# Patient Record
Sex: MALE | Race: WHITE | NOT HISPANIC OR LATINO | Employment: FULL TIME | URBAN - METROPOLITAN AREA
[De-identification: names, ages, dates, MRNs, and addresses within clinical notes are randomized per-mention and may not be internally consistent; named-entity substitution may affect disease eponyms.]

---

## 2022-07-06 ENCOUNTER — OFFICE VISIT (OUTPATIENT)
Dept: FAMILY MEDICINE | Facility: CLINIC | Age: 47
End: 2022-07-06
Payer: OTHER GOVERNMENT

## 2022-07-06 VITALS
HEIGHT: 71 IN | HEART RATE: 69 BPM | SYSTOLIC BLOOD PRESSURE: 121 MMHG | BODY MASS INDEX: 29.82 KG/M2 | DIASTOLIC BLOOD PRESSURE: 86 MMHG | WEIGHT: 213 LBS | TEMPERATURE: 97.7 F | OXYGEN SATURATION: 98 % | RESPIRATION RATE: 20 BRPM

## 2022-07-06 DIAGNOSIS — Z71.6 ENCOUNTER FOR SMOKING CESSATION COUNSELING: ICD-10-CM

## 2022-07-06 DIAGNOSIS — U09.9 POST COVID-19 CONDITION, UNSPECIFIED: Primary | ICD-10-CM

## 2022-07-06 PROCEDURE — 99204 OFFICE O/P NEW MOD 45 MIN: CPT | Mod: GC

## 2022-07-06 RX ORDER — FLUTICASONE PROPIONATE 50 MCG
1 SPRAY, SUSPENSION (ML) NASAL DAILY
Qty: 15.8 ML | Refills: 0 | Status: SHIPPED | OUTPATIENT
Start: 2022-07-06 | End: 2024-06-10

## 2022-07-06 RX ORDER — NICOTINE 21 MG/24HR
1 PATCH, TRANSDERMAL 24 HOURS TRANSDERMAL EVERY 24 HOURS
Qty: 28 PATCH | Refills: 4 | Status: SHIPPED | OUTPATIENT
Start: 2022-07-06 | End: 2024-06-10

## 2022-07-06 NOTE — PROGRESS NOTES
Preceptor Attestation:  Patient's case reviewed and discussed with Desire Hull MD resident and I evaluated the patient. I agree with written assessment and plan of care.  Supervising Physician:  Ariel Castellon MD, MD [facility]

## 2022-07-06 NOTE — PROGRESS NOTES
Assessment & Plan     Post covid-19 condition, unspecified  Patient with ongoing cough, head congestion, and sore throat from Covid. Positive on 6/24. Afebrile and oxygenating well. Lungs clear today. Concern for lingering URI symptoms causing post nasal drip and irritating throat. Will start Flonase and counseled on below symptom relief. Do not believe antibiotics are warranted as patient has known viral illness and his throat and lungs clear.   - fluticasone (FLONASE) 50 MCG/ACT nasal spray; Spray 1 spray into both nostrils daily  - continue salt water gargles  - warm showers, honey and tea, Tylenol and ibuprofen PRN    Encounter for smoking cessation counseling  Patient interested in smoking. Counseled on longer recovering from viral illnesses with his 1 PPD smoking history especially with ongoing chest heaviness from post nasal drip. He has tried chantix in the past but never patches. Will start patches and lozenges today. He does not wish to set a goal quit date but says he plans to slowly decrease to a 1/2 pack then 1/4 pack over a few months.   - nicotine (NICODERM CQ) 21 MG/24HR 24 hr patch; Place 1 patch onto the skin every 24 hours  - nicotine (NICORETTE) 4 MG lozenge; Place 1 lozenge (4 mg) inside cheek every hour as needed for smoking cessation       Tobacco Cessation:   reports that he has been smoking. He has been smoking about 1.00 pack per day. He has never used smokeless tobacco.  Tobacco Cessation Action Plan: Pharmacotherapies : Nicotine patch and other Nicotine replacement      Return if symptoms worsen or fail to improve.    Desire Hull MD  [facility]    Momo Aldrich is a 47 year old presenting for the following health issues:  RECHECK (Room 1 (with son) Covid 6/24/22-side effects, cough, sore throat, head cold)      HPI     Acute Illness  Acute illness concerns: Covid positive on 6/24   Onset/Duration: 6/24 -- now lingering symptoms  Symptoms:  Fever:  "No  Chills/Sweats: No  Headache (location?): YES  Sinus Pressure: YES  Conjunctivitis:  No  Ear Pain: no  Rhinorrhea: No  Congestion: YES  Sore Throat: YES  Cough: no  Wheeze: No  Decreased Appetite: No  Nausea: No  Vomiting: No  Diarrhea: No  Dysuria/Freq.: No  Dysuria or Hematuria: No  Fatigue/Achiness: No    Therapies tried and outcome: warm salt water. Some Tylenol and ibuprofen.     Review of Systems   Constitutional, HEENT, cardiovascular, pulmonary, gi and gu systems are negative, except as otherwise noted.      Objective    /86   Pulse 69   Temp 97.7  F (36.5  C) (Oral)   Resp 20   Ht 1.803 m (5' 11\")   Wt 96.6 kg (213 lb)   SpO2 98%   BMI 29.71 kg/m    Body mass index is 29.71 kg/m .  Physical Exam   GENERAL: healthy, alert and no distress  EYES: Eyes grossly normal to inspection, PERRL and conjunctivae and sclerae normal  HENT: nose: swollen and erythematous to bilateral turbinates, ear canals and TM's normal,  mouth without ulcers or lesions, or exudate  NECK: no adenopathy, no asymmetry, masses, or scars and thyroid normal to palpation  RESP: lungs clear to auscultation - no rales, rhonchi or wheezes  CV: regular rate and rhythm, normal S1 S2, no S3 or S4, no murmur, click or rub, no peripheral edema and peripheral pulses strong  ABDOMEN: soft, nontender, no hepatosplenomegaly, no masses and bowel sounds normal  MS: no gross musculoskeletal defects noted, no edema      "

## 2022-07-06 NOTE — PATIENT INSTRUCTIONS
Continue salt water gargle, cough drops, Tylenol and ibuprofen as needed for sore throat, honey in hot water/tea, and Flonase.

## 2023-02-09 ENCOUNTER — ANCILLARY PROCEDURE (OUTPATIENT)
Dept: GENERAL RADIOLOGY | Facility: CLINIC | Age: 48
End: 2023-02-09
Attending: FAMILY MEDICINE
Payer: OTHER GOVERNMENT

## 2023-02-09 ENCOUNTER — OFFICE VISIT (OUTPATIENT)
Dept: FAMILY MEDICINE | Facility: CLINIC | Age: 48
End: 2023-02-09
Payer: OTHER GOVERNMENT

## 2023-02-09 VITALS
WEIGHT: 217 LBS | SYSTOLIC BLOOD PRESSURE: 138 MMHG | BODY MASS INDEX: 30.38 KG/M2 | HEART RATE: 83 BPM | OXYGEN SATURATION: 98 % | HEIGHT: 71 IN | DIASTOLIC BLOOD PRESSURE: 89 MMHG | TEMPERATURE: 98.3 F | RESPIRATION RATE: 18 BRPM

## 2023-02-09 DIAGNOSIS — Z11.4 SCREENING FOR HIV (HUMAN IMMUNODEFICIENCY VIRUS): ICD-10-CM

## 2023-02-09 DIAGNOSIS — Z13.6 CARDIOVASCULAR SCREENING; LDL GOAL LESS THAN 70: ICD-10-CM

## 2023-02-09 DIAGNOSIS — Z11.59 NEED FOR HEPATITIS C SCREENING TEST: ICD-10-CM

## 2023-02-09 DIAGNOSIS — R06.2 WHEEZING: Primary | ICD-10-CM

## 2023-02-09 DIAGNOSIS — Z13.220 SCREENING FOR HYPERLIPIDEMIA: ICD-10-CM

## 2023-02-09 DIAGNOSIS — R06.2 WHEEZING: ICD-10-CM

## 2023-02-09 DIAGNOSIS — F17.200 NICOTINE DEPENDENCE WITH CURRENT USE: ICD-10-CM

## 2023-02-09 DIAGNOSIS — Z12.11 SCREEN FOR COLON CANCER: ICD-10-CM

## 2023-02-09 LAB — HGB BLD-MCNC: 16.5 G/DL (ref 13.3–17.7)

## 2023-02-09 PROCEDURE — 36415 COLL VENOUS BLD VENIPUNCTURE: CPT | Performed by: FAMILY MEDICINE

## 2023-02-09 PROCEDURE — 87389 HIV-1 AG W/HIV-1&-2 AB AG IA: CPT | Performed by: FAMILY MEDICINE

## 2023-02-09 PROCEDURE — 90471 IMMUNIZATION ADMIN: CPT | Performed by: FAMILY MEDICINE

## 2023-02-09 PROCEDURE — 85018 HEMOGLOBIN: CPT | Performed by: FAMILY MEDICINE

## 2023-02-09 PROCEDURE — 99214 OFFICE O/P EST MOD 30 MIN: CPT | Mod: 25 | Performed by: FAMILY MEDICINE

## 2023-02-09 PROCEDURE — 90677 PCV20 VACCINE IM: CPT | Performed by: FAMILY MEDICINE

## 2023-02-09 PROCEDURE — 71046 X-RAY EXAM CHEST 2 VIEWS: CPT | Mod: TC | Performed by: RADIOLOGY

## 2023-02-09 PROCEDURE — 80048 BASIC METABOLIC PNL TOTAL CA: CPT | Performed by: FAMILY MEDICINE

## 2023-02-09 PROCEDURE — 86803 HEPATITIS C AB TEST: CPT | Performed by: FAMILY MEDICINE

## 2023-02-09 PROCEDURE — 80061 LIPID PANEL: CPT | Performed by: FAMILY MEDICINE

## 2023-02-09 RX ORDER — ALBUTEROL SULFATE 90 UG/1
2 AEROSOL, METERED RESPIRATORY (INHALATION) EVERY 6 HOURS PRN
Qty: 18 G | Refills: 1 | Status: SHIPPED | OUTPATIENT
Start: 2023-02-09 | End: 2023-04-05

## 2023-02-09 RX ORDER — PREDNISONE 20 MG/1
40 TABLET ORAL DAILY
Qty: 10 TABLET | Refills: 0 | Status: SHIPPED | OUTPATIENT
Start: 2023-02-09 | End: 2023-02-14

## 2023-02-09 RX ORDER — AZITHROMYCIN 250 MG/1
TABLET, FILM COATED ORAL
Qty: 6 TABLET | Refills: 0 | Status: SHIPPED | OUTPATIENT
Start: 2023-02-09 | End: 2023-02-14

## 2023-02-09 ASSESSMENT — PAIN SCALES - GENERAL: PAINLEVEL: NO PAIN (0)

## 2023-02-09 NOTE — NURSING NOTE
Prior to immunization administration, verified patients identity using patient s name and date of birth. Please see Immunization Activity for additional information.     Screening Questionnaire for Adult Immunization    Are you sick today?   Yes   Do you have allergies to medications, food, a vaccine component or latex?   No   Have you ever had a serious reaction after receiving a vaccination?   No   Do you have a long-term health problem with heart, lung, kidney, or metabolic disease (e.g., diabetes), asthma, a blood disorder, no spleen, complement component deficiency, a cochlear implant, or a spinal fluid leak?  Are you on long-term aspirin therapy?   No   Do you have cancer, leukemia, HIV/AIDS, or any other immune system problem?   No   Do you have a parent, brother, or sister with an immune system problem?   No   In the past 3 months, have you taken medications that affect  your immune system, such as prednisone, other steroids, or anticancer drugs; drugs for the treatment of rheumatoid arthritis, Crohn s disease, or psoriasis; or have you had radiation treatments?   No   Have you had a seizure, or a brain or other nervous system problem?   No   During the past year, have you received a transfusion of blood or blood    products, or been given immune (gamma) globulin or antiviral drug?   No   For women: Are you pregnant or is there a chance you could become       pregnant during the next month?   No   Have you received any vaccinations in the past 4 weeks?   No     Immunization questionnaire was positive for at least one answer.  Notified Rose.        Per orders of Dr. Rose, injection of PPcV20 given by Tabatha Mendoza MA. Patient instructed to remain in clinic for 15 minutes afterwards, and to report any adverse reaction to me immediately.       Screening performed by Tabatha Mendoza MA on 2/9/2023 at 5:04 PM.

## 2023-02-09 NOTE — PROGRESS NOTES
Assessment & Plan     Wheezing  - with history of nicotine dependence presented to the clinic with wheezing, cough for more than 10 days with mild shortness of breath upon exertion.  -No known diagnosis of COPD.    O/E: Bilateral wheezes heard.  Saturation 98% room air.    -Possible COPD exacerbation but no official diagnosis of COPD.  -Prescribing Z-Kamran, prednisone with albuterol as needed.  -Ordered PFTs.  -Highly recommended smoking cessation.  -Received pneumococcal vaccine in clinic today    - azithromycin (ZITHROMAX) 250 MG tablet; Take 2 tablets (500 mg) by mouth daily for 1 day, THEN 1 tablet (250 mg) daily for 4 days.  - predniSONE (DELTASONE) 20 MG tablet; Take 2 tablets (40 mg) by mouth daily for 5 days  - XR Chest 2 Views; Future  - albuterol (PROAIR HFA/PROVENTIL HFA/VENTOLIN HFA) 108 (90 Base) MCG/ACT inhaler; Inhale 2 puffs into the lungs every 6 hours as needed for shortness of breath, wheezing or cough  - General PFT Lab (Please always keep checked); Future  - Pulmonary Function Test; Future    Nicotine dependence with current use  -Smoking for more than 30 years.  Currently on 1 pack/day.  -He added that his job is stressful so he could not quit smoking at this point.  -Offered help with patches/gum/medications but patient reported that he has plenty of them at home.  -Explained the importance of smoking cessation especially since progressing towards lung disease     - General PFT Lab (Please always keep checked); Future  - Pulmonary Function Test; Future    Screen for colon cancer  -No family history of colon cancers.  First-time screening.    - Colonoscopy Screening  Referral; Future    Screening for HIV (human immunodeficiency virus)    - HIV Antigen Antibody Combo; Future  - HIV Antigen Antibody Combo    Need for hepatitis C screening test    - Hepatitis C Screen Reflex to HCV RNA Quant and Genotype; Future  - Hepatitis C Screen Reflex to HCV RNA Quant and  "Genotype    Screening for hyperlipidemia    - Lipid panel reflex to direct LDL Non-fasting; Future  - Lipid panel reflex to direct LDL Non-fasting    CARDIOVASCULAR SCREENING; LDL GOAL LESS THAN 70    - Hemoglobin; Future  - Basic metabolic panel  (Ca, Cl, CO2, Creat, Gluc, K, Na, BUN); Future  - Hemoglobin  - Basic metabolic panel  (Ca, Cl, CO2, Creat, Gluc, K, Na, BUN)         BMI:   Estimated body mass index is 30.27 kg/m  as calculated from the following:    Height as of this encounter: 1.803 m (5' 11\").    Weight as of this encounter: 98.4 kg (217 lb).           Return in about 10 days (around 2/19/2023), or if symptoms worsen or fail to improve.    Rose Carrero MD  [facility] KELLY Aldrich is a 48 year old, presenting for the following health issues:  Infection (Lung problem)      History of Present Illness       Reason for visit:  Possible lung infection  Symptom onset:  1-2 weeks ago    He eats 0-1 servings of fruits and vegetables daily.He consumes 3 sweetened beverage(s) daily.He exercises with enough effort to increase his heart rate 9 or less minutes per day.  He exercises with enough effort to increase his heart rate 3 or less days per week.   He is taking medications regularly.     -Productive cough for 2 weeks  -Denied fever  -H/o smoking - 1PPD- started new job             Review of Systems   Constitutional, HEENT, cardiovascular, pulmonary, gi and gu systems are negative, except as otherwise noted.      Objective    /89   Pulse 83   Temp 98.3  F (36.8  C) (Tympanic)   Resp 18   Ht 1.803 m (5' 11\")   Wt 98.4 kg (217 lb)   SpO2 98%   BMI 30.27 kg/m    Body mass index is 30.27 kg/m .  Physical Exam   GENERAL: healthy, alert and no distress  NECK: no adenopathy, no asymmetry, masses, or scars and thyroid normal to palpation  RESP: Bilateral wheezes heard.  CV: regular rate and rhythm, normal S1 S2, no S3 or S4, no murmur, click or " rub, no peripheral edema and peripheral pulses strong  ABDOMEN: soft, nontender, no hepatosplenomegaly, no masses and bowel sounds normal  MS: no gross musculoskeletal defects noted, no edema

## 2023-02-10 LAB
ANION GAP SERPL CALCULATED.3IONS-SCNC: 2 MMOL/L (ref 3–14)
BUN SERPL-MCNC: 14 MG/DL (ref 7–30)
CALCIUM SERPL-MCNC: 8.9 MG/DL (ref 8.5–10.1)
CHLORIDE BLD-SCNC: 108 MMOL/L (ref 94–109)
CHOLEST SERPL-MCNC: 209 MG/DL
CO2 SERPL-SCNC: 29 MMOL/L (ref 20–32)
CREAT SERPL-MCNC: 0.95 MG/DL (ref 0.66–1.25)
FASTING STATUS PATIENT QL REPORTED: NO
GFR SERPL CREATININE-BSD FRML MDRD: >90 ML/MIN/1.73M2
GLUCOSE BLD-MCNC: 91 MG/DL (ref 70–99)
HCV AB SERPL QL IA: NONREACTIVE
HDLC SERPL-MCNC: 29 MG/DL
HIV 1+2 AB+HIV1 P24 AG SERPL QL IA: NONREACTIVE
LDLC SERPL CALC-MCNC: 139 MG/DL
NONHDLC SERPL-MCNC: 180 MG/DL
POTASSIUM BLD-SCNC: 4.6 MMOL/L (ref 3.4–5.3)
SODIUM SERPL-SCNC: 139 MMOL/L (ref 133–144)
TRIGL SERPL-MCNC: 205 MG/DL

## 2023-02-12 ENCOUNTER — HEALTH MAINTENANCE LETTER (OUTPATIENT)
Age: 48
End: 2023-02-12

## 2023-04-05 ENCOUNTER — HOSPITAL ENCOUNTER (OUTPATIENT)
Facility: AMBULATORY SURGERY CENTER | Age: 48
End: 2023-04-05
Attending: INTERNAL MEDICINE
Payer: OTHER GOVERNMENT

## 2023-04-05 ENCOUNTER — TELEPHONE (OUTPATIENT)
Dept: GASTROENTEROLOGY | Facility: CLINIC | Age: 48
End: 2023-04-05
Payer: OTHER GOVERNMENT

## 2023-04-05 NOTE — TELEPHONE ENCOUNTER
Screening Questions  BLUE  KIND OF PREP RED  LOCATION [review exclusion criteria] GREEN  SEDATION TYPE        Y Are you active on mychart?       JAMAL SILVER Ordering/Referring Provider?        [facility] What type of coverage do you have?      N Have you had a positive covid test in the last 14 days?     30.6 1. BMI  [BMI 40+ - review exclusion criteria]    Y  2. Are you able to give consent for your medical care? [IF NO,RN REVIEW]          N  3. Are you taking any prescription pain medications on a routine schedule   (ex narcotics: oxycodone, roxicodone, oxycontin,  and percocet)? [RN Review]          3a. EXTENDED PREP What kind of prescription?     N 4. Do you have any chemical dependencies such as alcohol, street drugs, or methadone?        **If yes 3- 5 , please schedule with MAC sedation.**          IF YES TO ANY 6 - 10 - HOSPITAL SETTING ONLY.     N 6.   Do you need assistance transferring?     N 7.   Have you had a heart or lung transplant?    N 8.   Are you currently on dialysis?   N 9.   Do you use daily home oxygen?   N 10. Do you take nitroglycerin?   10a.  If yes, how often?     11. [FEMALES]   Are you currently pregnant?    11a.  If yes, how many weeks? [ Greater than 12 weeks, OR NEEDED]    N 12. Do you have Pulmonary Hypertension? *NEED PAC APPT AT [facility] w/ MAC*     N 13. [review exclusion criteria]  Do you have any implantable devices in your body (pacemaker, defib, LVAD)?    N 14. In the past 6 months, have you had any heart related issues including cardiomyopathy or heart attack?     14a.  If yes, did it require cardiac stenting if so when?     N 15. Have you had a stroke or Transient ischemic attack (TIA - aka  mini stroke ) within 6 months?      N 16. Do you have mod to severe Obstructive Sleep Apnea?  [Hospital only]    N 17. Do you have SEVERE AND UNCONTROLLED asthma? *NEED PAC APPT AT [facility] w/MAC*     18. Are you currently taking any blood thinners?     18a. No. Continue to  "19.   18b.    N 19. Do you take the medication Phentermine?    19a. If yes, \"Hold for 7 days before procedure.  Please consult your prescribing provider if you have questions about holding this medication.\"     N  20. Do you have chronic kidney disease?      N  21. Do you have a diagnosis of diabetes?     N  22. On a regular basis do you go 3-5 days between bowel movements?      23. Preferred LOCAL Pharmacy for Pre Prescription    [ LIST ONLY ONE PHARMACY]     [id number] - [place], [place] - [address] [place] [address] AT [facility]        - CLOSING REMINDERS -    Informed patient they will need an adult    Cannot take any type of public or medical transportation alone    Conscious Sedation- Needs  for 6 hours after the procedure       MAC/General-Needs  for 24 hours after procedure    Pre-Procedure Covid test to be completed [[facility] PCR Testing Required]    Confirmed Nurse will call to complete assessment       - SCHEDULING DETAILS -  N Hospital Setting Required? If yes, what is the exclusion?:    ESE  Surgeon    6/7  Date of Procedure  Lower Endoscopy [Colonoscopy]  Type of Procedure Scheduled  [facility]   MIRALAX GATORADE WITHOUT MAGNEISUM CITRATE Which Colonoscopy Prep was Sent?     CS Sedation Type     N PAC / Pre-op Required                 "

## 2023-05-23 ENCOUNTER — TELEPHONE (OUTPATIENT)
Dept: GASTROENTEROLOGY | Facility: CLINIC | Age: 48
End: 2023-05-23

## 2023-05-23 NOTE — TELEPHONE ENCOUNTER
Attempted to contact patient regarding upcoming Colonoscopy  procedure on 6.7.2023 for pre assessment questions. No answer.     Left message to return call to [phone] #[phone]    Discuss Covid policy and designated  policy.    Pre op exam? N/A    Arrival time: 60810. Procedure time: 1125    Facility location: [facility] [place]; [address], [place] [address]    Sedation type: Conscious sedation     NSAIDs? No    Anticoagulants: No    Electronic implanted devices? No    Diabetic? No    Indication for procedure: screening colonoscopy    Bowel prep recommendation: Miralax prep without magnesium citrate     Prep instructions sent via [employer].       Seema Amaya RN  Endoscopy Procedure Pre Assessment RN

## 2023-05-26 NOTE — TELEPHONE ENCOUNTER
Attempted to reach the patient to complete pre-assessment. Second attempt. No answer. Left message. VeriTran[employer] message sent.     Minerva Partida RN   Endoscopy Procedure Pre Assessment RN

## 2023-05-30 ENCOUNTER — TELEPHONE (OUTPATIENT)
Dept: GASTROENTEROLOGY | Facility: CLINIC | Age: 48
End: 2023-05-30
Payer: OTHER GOVERNMENT

## 2023-05-30 NOTE — TELEPHONE ENCOUNTER
Valdemar Rivero Jr.   Caller:   Reason for Reschedule/Cancellation (please be detailed, any staff messages or encounters to note?):     PER PT -- CANCEL WITH NO R/S       Prior to reschedule please review:    Ordering Provider:Rose Harper MD     Sedation per order:MODERATE     Does patient have any ASC Exclusions, please identify?: N       Notes on Cancelled Procedure:  1. Procedure:Lower Endoscopy [Colonoscopy]   2. Date: 06/07/2023  3. Location:[facility] [place]; [address] [place] [address] [address], [place] [address]  4. Surgeon: ESE         Rescheduled: NO

## 2023-10-26 ENCOUNTER — MYC MEDICAL ADVICE (OUTPATIENT)
Dept: FAMILY MEDICINE | Facility: CLINIC | Age: 48
End: 2023-10-26
Payer: OTHER GOVERNMENT

## 2023-10-26 DIAGNOSIS — Z12.11 SCREENING FOR COLON CANCER: Primary | ICD-10-CM

## 2023-12-14 ENCOUNTER — TELEPHONE (OUTPATIENT)
Dept: GASTROENTEROLOGY | Facility: CLINIC | Age: 48
End: 2023-12-14
Payer: OTHER GOVERNMENT

## 2023-12-14 NOTE — TELEPHONE ENCOUNTER
"Endoscopy Scheduling Screen    Have you had a positive Covid test in the last 14 days?  No    Are you active on MyChart?   Yes    What insurance is in the chart?  Other:  [facility]    Ordering/Referring Provider:   MARYURI WEST        (If ordering provider performs procedure, schedule with ordering provider unless otherwise instructed. )    BMI: Estimated body mass index is 30.27 kg/m  as calculated from the following:    Height as of 2/9/23: 1.803 m (5' 11\").    Weight as of 2/9/23: 98.4 kg (217 lb).     Sedation Ordered  moderate sedation.   If patient BMI > 50 do not schedule in ASC.    If patient BMI > 45 do not schedule at [facility].    Are you taking methadone or Suboxone?  No    Are you taking any prescription medications for pain 3 or more times per week?   NO - No RN review required.    Do you have a history of malignant hyperthermia or adverse reaction to anesthesia?  No     Have you been diagnosed or told you have pulmonary hypertension?   No    Do you have an LVAD?  No    Have you been told you have moderate to severe sleep apnea?  No    Have you been told you have COPD, asthma, or any other lung disease?  No    Do you have any heart conditions?  No     Have you ever had an organ transplant?   No    Have you ever had or are you awaiting a heart or lung transplant?   No    Have you had a stroke or transient ischemic attack (TIA aka \"mini stroke\" in the last 6 months?   No    Have you been diagnosed with or been told you have cirrhosis of the liver?   No    Are you currently on dialysis?   No    Do you need assistance transferring?   No    BMI: Estimated body mass index is 30.27 kg/m  as calculated from the following:    Height as of 2/9/23: 1.803 m (5' 11\").    Weight as of 2/9/23: 98.4 kg (217 lb).     Is patients BMI > 40 and scheduling location [facility]?  No    Do you take an injectable medication for weight loss or diabetes (excluding insulin)?  No    Do you take the medication Naltrexone?  No    Do you take blood " thinners?  No       Prep   Are you currently on dialysis or do you have chronic kidney disease?  No    Do you have a diagnosis of diabetes?  No    Do you have a diagnosis of cystic fibrosis (CF)?  No    On a regular basis do you go 3 -5 days between bowel movements?  No    BMI > 40?  No    Preferred Pharmacy:    [id number] - [place], [place] - [address] [place] [address] AT [facility] & [place]  [address] [place] [address]  Phone: [phone] Fax: [phone]      Final Scheduling Details   Colonoscopy prep sent?  Standard MiraLAX    Procedure scheduled  Colonoscopy    Surgeon:  ABRIL     Date of procedure:  04/15/2024     Pre-OP / PAC:   No - Not required for this site.    Location  [facility] - Patient preference.    Sedation   MAC/Deep Sedation  Per Location      Patient Reminders:   You will receive a call from a Nurse to review instructions and health history.  This assessment must be completed prior to your procedure.  Failure to complete the Nurse assessment may result in the procedure being cancelled.      On the day of your procedure, please designate an adult(s) who can drive you home stay with you for the next 24 hours. The medicines used in the exam will make you sleepy. You will not be able to drive.      You cannot take public transportation, ride share services, or non-medical taxi service without a responsible caregiver.  Medical transport services are allowed with the requirement that a responsible caregiver will receive you at your destination.  We require that drivers and caregivers are confirmed prior to your procedure.

## 2024-03-10 ENCOUNTER — HEALTH MAINTENANCE LETTER (OUTPATIENT)
Age: 49
End: 2024-03-10

## 2024-04-12 ENCOUNTER — ANESTHESIA EVENT (OUTPATIENT)
Dept: SURGERY | Facility: AMBULATORY SURGERY CENTER | Age: 49
End: 2024-04-12
Payer: OTHER GOVERNMENT

## 2024-04-15 ENCOUNTER — ANESTHESIA (OUTPATIENT)
Dept: SURGERY | Facility: AMBULATORY SURGERY CENTER | Age: 49
End: 2024-04-15
Payer: OTHER GOVERNMENT

## 2024-04-15 ENCOUNTER — HOSPITAL ENCOUNTER (OUTPATIENT)
Facility: AMBULATORY SURGERY CENTER | Age: 49
Discharge: HOME | End: 2024-04-15
Attending: SURGERY
Payer: OTHER GOVERNMENT

## 2024-04-15 VITALS
SYSTOLIC BLOOD PRESSURE: 167 MMHG | RESPIRATION RATE: 18 BRPM | OXYGEN SATURATION: 95 % | TEMPERATURE: 97 F | DIASTOLIC BLOOD PRESSURE: 97 MMHG | BODY MASS INDEX: 33.6 KG/M2 | WEIGHT: 240 LBS | HEIGHT: 71 IN

## 2024-04-15 DIAGNOSIS — Z12.11 SCREENING FOR COLON CANCER: ICD-10-CM

## 2024-04-15 LAB — COLONOSCOPY: NORMAL

## 2024-04-15 RX ORDER — ONDANSETRON 4 MG/1
4 TABLET, ORALLY DISINTEGRATING ORAL EVERY 30 MIN PRN
Status: DISCONTINUED | OUTPATIENT
Start: 2024-04-15 | End: 2024-04-16 | Stop reason: HOSPADM

## 2024-04-15 RX ORDER — PROPOFOL 10 MG/ML
INJECTION, EMULSION INTRAVENOUS CONTINUOUS PRN
Status: DISCONTINUED | OUTPATIENT
Start: 2024-04-15 | End: 2024-04-15

## 2024-04-15 RX ORDER — ONDANSETRON 2 MG/ML
4 INJECTION INTRAMUSCULAR; INTRAVENOUS EVERY 30 MIN PRN
Status: DISCONTINUED | OUTPATIENT
Start: 2024-04-15 | End: 2024-04-16 | Stop reason: HOSPADM

## 2024-04-15 RX ORDER — PROPOFOL 10 MG/ML
INJECTION, EMULSION INTRAVENOUS PRN
Status: DISCONTINUED | OUTPATIENT
Start: 2024-04-15 | End: 2024-04-15

## 2024-04-15 RX ORDER — LIDOCAINE HYDROCHLORIDE 20 MG/ML
INJECTION, SOLUTION INFILTRATION; PERINEURAL PRN
Status: DISCONTINUED | OUTPATIENT
Start: 2024-04-15 | End: 2024-04-15

## 2024-04-15 RX ORDER — SODIUM CHLORIDE, SODIUM LACTATE, POTASSIUM CHLORIDE, CALCIUM CHLORIDE 600; 310; 30; 20 MG/100ML; MG/100ML; MG/100ML; MG/100ML
INJECTION, SOLUTION INTRAVENOUS CONTINUOUS
Status: DISCONTINUED | OUTPATIENT
Start: 2024-04-15 | End: 2024-04-16 | Stop reason: HOSPADM

## 2024-04-15 RX ORDER — NALOXONE HYDROCHLORIDE 0.4 MG/ML
0.1 INJECTION, SOLUTION INTRAMUSCULAR; INTRAVENOUS; SUBCUTANEOUS
Status: DISCONTINUED | OUTPATIENT
Start: 2024-04-15 | End: 2024-04-16 | Stop reason: HOSPADM

## 2024-04-15 RX ORDER — LABETALOL 20 MG/4 ML (5 MG/ML) INTRAVENOUS SYRINGE
PRN
Status: DISCONTINUED | OUTPATIENT
Start: 2024-04-15 | End: 2024-04-15

## 2024-04-15 RX ORDER — LIDOCAINE 40 MG/G
CREAM TOPICAL
Status: DISCONTINUED | OUTPATIENT
Start: 2024-04-15 | End: 2024-04-16 | Stop reason: HOSPADM

## 2024-04-15 RX ORDER — ONDANSETRON 2 MG/ML
INJECTION INTRAMUSCULAR; INTRAVENOUS PRN
Status: DISCONTINUED | OUTPATIENT
Start: 2024-04-15 | End: 2024-04-15

## 2024-04-15 RX ADMIN — SODIUM CHLORIDE, SODIUM LACTATE, POTASSIUM CHLORIDE, CALCIUM CHLORIDE: 600; 310; 30; 20 INJECTION, SOLUTION INTRAVENOUS at 10:05

## 2024-04-15 RX ADMIN — LIDOCAINE HYDROCHLORIDE 3 ML: 20 INJECTION, SOLUTION INFILTRATION; PERINEURAL at 10:09

## 2024-04-15 RX ADMIN — LABETALOL 20 MG/4 ML (5 MG/ML) INTRAVENOUS SYRINGE 5 MG: at 10:22

## 2024-04-15 RX ADMIN — PROPOFOL 50 MG: 10 INJECTION, EMULSION INTRAVENOUS at 10:09

## 2024-04-15 RX ADMIN — ONDANSETRON 4 MG: 2 INJECTION INTRAMUSCULAR; INTRAVENOUS at 10:15

## 2024-04-15 RX ADMIN — PROPOFOL 200 MCG/KG/MIN: 10 INJECTION, EMULSION INTRAVENOUS at 10:09

## 2024-04-15 RX ADMIN — PROPOFOL 20 MG: 10 INJECTION, EMULSION INTRAVENOUS at 10:10

## 2024-04-15 NOTE — DISCHARGE INSTRUCTIONS
If you have any questions or concerns regarding your procedure, please contact Dr. Bell, his office number is [phone].     Discharge Instructions:    Take you medications as directed and follow up with you primary provider as scheduled.   You should expect to pass air from your rectum after the procedure.   Follow these care guidelines during your recovery for the next 24 hours.   If you have any questions or concerns please contact the provider that performed your procedure.     You were given medicine for sedation:  You have been given medicines during your procedure that might make you sleepy and weak. To prevent problems:    *Rest for the rest of the day after you are home. You should be back to your normal activity tomorrow.  *For the next 24 hours:   -Do not drink alcoholic beverages.   -Do not make any important decisions or sign any legal forms.   -Do not work around machinery or power equipment.     The medicines used for sedation may make you feel nauseated.   *Start with clear liquids, such as tea, jell-o, broth and ginger ale. As you feel better you may add soft foods such as pudding and ice cream.   *When you no longer feel nauseated, you may try your normal diet.     You should be back to eating your normal after 24 hours.     Call if you have any of these problems:  *Fever of 101 degree F or 38 degrees C  *Bleeding from the rectum  *Black stool or blood in your bowel movements  *Nausea with vomiting that does not ease after a few hours.  *Abdominal pain or bloating  *Fainting

## 2024-04-15 NOTE — H&P
"PRE PROCEDURE NOTE - H & P      Chief Complaint/Reason for Procedure:              Screening colonoscopy    Current Outpatient Medications   Medication Sig Dispense Refill    albuterol (PROAIR HFA/PROVENTIL HFA/VENTOLIN HFA) 108 (90 Base) MCG/ACT inhaler INHALE 2 PUFFS INTO THE LUNGS EVERY 6 HOURS AS NEEDED FOR SHORTNESS OF BREATH OR WHEEZING OR COUGH 6.7 g 1    fluticasone (FLONASE) 50 MCG/ACT nasal spray Spray 1 spray into both nostrils daily (Patient not taking: Reported on 2/9/2023) 15.8 mL 0    nicotine (NICODERM CQ) 21 MG/24HR 24 hr patch Place 1 patch onto the skin every 24 hours (Patient not taking: Reported on 2/9/2023) 28 patch 4    nicotine (NICORETTE) 4 MG lozenge Place 1 lozenge (4 mg) inside cheek every hour as needed for smoking cessation (Patient not taking: Reported on 2/9/2023) 168 lozenge 11     Current Facility-Administered Medications   Medication Dose Route Frequency Provider Last Rate Last Admin    lactated ringers infusion   Intravenous Continuous Rachael Sheth MD        lidocaine (LMX4) kit   Topical Q1H PRN Rachael Sheth MD        lidocaine 1 % 0.1-1 mL  0.1-1 mL Other Q1H PRN Rachael Sheth MD        sodium chloride (PF) 0.9% PF flush 3 mL  3 mL Intracatheter Q8H Rachael Sheth MD        sodium chloride (PF) 0.9% PF flush 3 mL  3 mL Intracatheter q1 min prn Rachael Sheth MD            No Known Allergies    Past Medical History:   Diagnosis Date    Gastroesophageal reflux disease     History of angina        Past Surgical History:   Procedure Laterality Date    ARTHROSCOPIC REPAIR ACL Right     DENTAL SURGERY      [place] teeth removal       Pre-sedation assessment:            BP (!) 161/103   Temp 98  F (36.7  C) (Temporal)   Resp 16   Ht 1.803 m (5' 11\")   Wt 108.9 kg (240 lb)   SpO2 92%   BMI 33.47 kg/m    General appearance: alert, appears stated age, and cooperative  Airway: No gross abnormalities appreciated that would increase risk of airway " compromise.   Heart: Regular rate and rhythm  Lungs: Normal respiratory effort    ASA Classification: 1    Sedation Plan based on assessment: Moderate    Impression:  Patient deemed adequate candidate for moderate sedation         Plan:   colonoscopy      Horace Bell MD  4/15/2024 10:00 AM  [phone]

## 2024-04-15 NOTE — ANESTHESIA POSTPROCEDURE EVALUATION
Patient: Valdemar Rivero Jr.    Procedure: Procedure(s):  COLONOSCOPY       Anesthesia Type:  MAC    Note:  Disposition: Outpatient   Postop Pain Control: Uneventful            Sign Out: Well controlled pain   PONV: No   Neuro/Psych: Uneventful            Sign Out: Acceptable/Baseline neuro status   Airway/Respiratory: Uneventful            Sign Out: Acceptable/Baseline resp. status   CV/Hemodynamics: Uneventful            Sign Out: Acceptable CV status; No obvious hypovolemia; No obvious fluid overload   Other NRE: NONE   DID A NON-ROUTINE EVENT OCCUR? No           Last vitals:  Vitals Value Taken Time   /98 04/15/24 1100   Temp 97  F (36.1  C) 04/15/24 1045   Pulse 83 04/15/24 1129   Resp 18 04/15/24 1045   SpO2 92 % 04/15/24 1129   Vitals shown include unfiled device data.    Electronically Signed By: Ariel Chaudhari MD  April 15, 2024  12:42 PM

## 2024-04-15 NOTE — ANESTHESIA PREPROCEDURE EVALUATION
"Anesthesia Pre-Procedure Evaluation    Patient: Valdemar Rivero Jr.   MRN: [id number] : 1975        Procedure : Procedure(s):  COLONOSCOPY          Past Medical History:   Diagnosis Date    Gastroesophageal reflux disease     History of angina       Past Surgical History:   Procedure Laterality Date    ARTHROSCOPIC REPAIR ACL Right     DENTAL SURGERY      Widsom teeth removal      No Known Allergies   Social History     Tobacco Use    Smoking status: Every Day     Current packs/day: 1.00     Types: Cigarettes    Smokeless tobacco: Never   Substance Use Topics    Alcohol use: Never     Comment: sober 1.5 years, 20      Wt Readings from Last 1 Encounters:   24 108.9 kg (240 lb)        Anesthesia Evaluation            ROS/MED HX  ENT/Pulmonary:       Neurologic:       Cardiovascular:    (-) murmur   METS/Exercise Tolerance:     Hematologic:       Musculoskeletal:       GI/Hepatic:     (+) GERD,       bowel prep,            Renal/Genitourinary:       Endo:     (+)               Obesity,       Psychiatric/Substance Use:       Infectious Disease:       Malignancy:       Other:            Physical Exam    Airway  airway exam normal      Mallampati: II   TM distance: > 3 FB   Neck ROM: full   Mouth opening: > 3 cm    Respiratory Devices and Support         Dental       (+) Completely normal teeth      Cardiovascular   cardiovascular exam normal       Rhythm and rate: regular and normal (-) no murmur    Pulmonary   pulmonary exam normal        breath sounds clear to auscultation           OUTSIDE LABS:  CBC:   Lab Results   Component Value Date    HGB 16.5 2023     BMP:   Lab Results   Component Value Date     2023    POTASSIUM 4.6 2023    CHLORIDE 108 2023    CO2 29 2023    BUN 14 2023    CR 0.95 2023    GLC 91 2023     COAGS: No results found for: \"PTT\", \"INR\", \"FIBR\"  POC: No results found for: \"BGM\", \"HCG\", \"HCGS\"  HEPATIC: No results found for: " "\"ALBUMIN\", \"PROTTOTAL\", \"ALT\", \"AST\", \"GGT\", \"ALKPHOS\", \"BILITOTAL\", \"BILIDIRECT\", \"GILBERTO\"  OTHER:   Lab Results   Component Value Date    FAITH 8.9 02/09/2023       Anesthesia Plan    ASA Status:  2    NPO Status:  NPO Appropriate    Anesthesia Type: MAC.     - Reason for MAC: straight local not clinically adequate              Consents    Anesthesia Plan(s) and associated risks, benefits, and realistic alternatives discussed. Questions answered and patient/representative(s) expressed understanding.     - Discussed: Risks, Benefits and Alternatives for BOTH SEDATION and the PROCEDURE were discussed     - Discussed with:  Patient            Postoperative Care    Pain management: IV analgesics, Oral pain medications, Multi-modal analgesia.   PONV prophylaxis: Ondansetron (or other 5HT-3), Background Propofol Infusion     Comments:    Other Comments: Risks of MAC anesthesia including but not limited to recall, awareness, and potential conversion to general anesthesia discussed.           Ariel Chaudhari MD    I have reviewed the pertinent notes and labs in the chart from the past 30 days and (re)examined the patient.  Any updates or changes from those notes are reflected in this note.              # Obesity: Estimated body mass index is 33.47 kg/m  as calculated from the following:    Height as of this encounter: 1.803 m (5' 11\").    Weight as of this encounter: 108.9 kg (240 lb).      "

## 2024-04-15 NOTE — ANESTHESIA CARE TRANSFER NOTE
Patient: Valdemar Rivero Jr.    Procedure: Procedure(s):  COLONOSCOPY       Diagnosis: Screening for colon cancer [Z12.11]  Diagnosis Additional Information: No value filed.    Anesthesia Type:   MAC     Note:    Oropharynx: oropharynx clear of all foreign objects and spontaneously breathing  Level of Consciousness: awake  Oxygen Supplementation: room air    Independent Airway: airway patency satisfactory and stable  Dentition: dentition unchanged  Vital Signs Stable: post-procedure vital signs reviewed and stable  Report to RN Given: handoff report given  Patient transferred to: Phase II    Handoff Report: Identifed the Patient, Identified the Reponsible Provider, Reviewed the pertinent medical history, Discussed the surgical course, Reviewed Intra-OP anesthesia mangement and issues during anesthesia, Set expectations for post-procedure period and Allowed opportunity for questions and acknowledgement of understanding      Vitals:  Vitals Value Taken Time   /97 04/15/24 1045   Temp 97  F (36.1  C) 04/15/24 1045   Pulse 87 04/15/24 1045   Resp 18 04/15/24 1045   SpO2 95 % 04/15/24 1045       Electronically Signed By: MEKHI Witt CRNA  April 15, 2024  10:47 AM

## 2024-04-26 ENCOUNTER — OFFICE VISIT (OUTPATIENT)
Dept: FAMILY MEDICINE | Facility: CLINIC | Age: 49
End: 2024-04-26
Payer: OTHER GOVERNMENT

## 2024-04-26 VITALS
SYSTOLIC BLOOD PRESSURE: 157 MMHG | WEIGHT: 238.08 LBS | HEART RATE: 84 BPM | DIASTOLIC BLOOD PRESSURE: 111 MMHG | BODY MASS INDEX: 33.33 KG/M2 | RESPIRATION RATE: 20 BRPM | HEIGHT: 71 IN | TEMPERATURE: 97.6 F | OXYGEN SATURATION: 96 %

## 2024-04-26 DIAGNOSIS — E78.5 HYPERLIPIDEMIA LDL GOAL <130: ICD-10-CM

## 2024-04-26 DIAGNOSIS — Z71.6 ENCOUNTER FOR SMOKING CESSATION COUNSELING: ICD-10-CM

## 2024-04-26 DIAGNOSIS — K21.9 GASTROESOPHAGEAL REFLUX DISEASE WITHOUT ESOPHAGITIS: ICD-10-CM

## 2024-04-26 DIAGNOSIS — I10 ESSENTIAL HYPERTENSION: Primary | ICD-10-CM

## 2024-04-26 LAB — HBA1C MFR BLD: 5.4 % (ref 0–5.6)

## 2024-04-26 PROCEDURE — 83036 HEMOGLOBIN GLYCOSYLATED A1C: CPT

## 2024-04-26 PROCEDURE — 84443 ASSAY THYROID STIM HORMONE: CPT

## 2024-04-26 PROCEDURE — 80053 COMPREHEN METABOLIC PANEL: CPT

## 2024-04-26 PROCEDURE — 99214 OFFICE O/P EST MOD 30 MIN: CPT

## 2024-04-26 PROCEDURE — 36415 COLL VENOUS BLD VENIPUNCTURE: CPT

## 2024-04-26 PROCEDURE — 80061 LIPID PANEL: CPT

## 2024-04-26 RX ORDER — FAMOTIDINE 40 MG/1
40 TABLET, FILM COATED ORAL DAILY
Qty: 90 TABLET | Refills: 3 | Status: SHIPPED | OUTPATIENT
Start: 2024-04-26 | End: 2024-06-10

## 2024-04-26 RX ORDER — LOSARTAN POTASSIUM 25 MG/1
25 TABLET ORAL DAILY
Qty: 90 TABLET | Refills: 3 | Status: SHIPPED | OUTPATIENT
Start: 2024-04-26 | End: 2024-06-10

## 2024-04-26 NOTE — PROGRESS NOTES
I have personally reviewed the history and examination as documented by Dr. Mathews.  I was present during key portions of the visit and agree with the assessment and plan as documented for 49 yr old male w/ hx of GERD, recent colonoscopy w/ polyps, here for new HTN diagnosis, concern for hiatal hernia. Will start ARB for BP and refer for upper scope to further eval refractory GERD/ ?hiatal hernia. Smoking cessation reviewed. Precautions given. Anticipatory guidance given.     Graham Perez MD  April 26, 2024  2:34 PM

## 2024-04-26 NOTE — PROGRESS NOTES
Assessment & Plan     Essential hypertension  Elevated to 157/111, consistent with readings he gets at home. Will start losartan today and see him back in 1 month (starting a new job in two weeks and did not want to come in sooner than that). Encouraged to take blood pressure at home as well. Has not had labs in over 1 year, so will get baseline labs today as well.   - losartan (COZAAR) 25 MG tablet  Dispense: 90 tablet; Refill: 3  - Comprehensive metabolic panel  - Hemoglobin A1c  - Lipid panel  - TSH with free T4 reflex    Encounter for smoking cessation counseling  Discussed starting some sort of intervention for smoking cessation today, but patient declined at this time as he prefers to cut back on the amount of cigarettes he's smoking daily first. Has failed nicotine replacement and Chantix in the past. Does have history of depression/anxiety, so bupropion could be a reasonable choice in the future.      Gastroesophageal reflux disease without esophagitis  Has had worsening heartburn and chest tightness/fullness, consistent with GERD. Denies any exertional chest pain or reproduction of symptoms with exertion. Has tried omeprazole in the past, but is adamant that this caused a GI bleed. Does have a history of alcohol use disorder. Differential diagnosis includes GERD, hiatal hernia, esophagitis, peptic ulcer disease, H. Pylori. Given clinical scenario, will refer for EGD to assess for anatomical cause versus ulcer/esophagitis in addition to trial of famotidine today. Of note, has multiple risk factors for CVD and would work this up with stress test at next visit if EGD and famotidine is unrevealing.   - famotidine (PEPCID) 40 MG tablet  Dispense: 90 tablet; Refill: 3  - UPPER GI ENDOSCOPY        Nicotine/Tobacco Cessation  He reports that he has been smoking cigarettes. He has never used smokeless tobacco.  Nicotine/Tobacco Cessation Plan  Information offered: Patient not interested at this  "time      BMI  Estimated body mass index is 32.96 kg/m  as calculated from the following:    Height as of this encounter: 1.81 m (5' 11.26\").    Weight as of this encounter: 108 kg (238 lb 1.3 oz).       Subjective   Yony is a 49 year old, presenting for the following health issue:  #Hearburn/GERD  States he has dealt with heartburn symptoms for years and has progressively been getting worse. Feels a sensation of fullness of chest pressure/tightness when leaning over, lying down, and especially after big meals. Denies any recurrence of symptoms with exertion. Denies any trouble swallowing solids or liquids. He has tried omeprazole in the past and was adamant that this caused him to have darker/bloody stools. Did recently have colonoscopy in which he had six polyps removed and was recommended 3 year follow-up. He also has a history of alcohol use disorder (sober for five years now).     #Elevated blood pressure   Has a blood pressure cuff at home and gets readings around 150/100 consistently at home. Denies any problems with vision, denies headaches, dizziness, or shortness of breath.     #Smoking cessation  Cut down from pack per day to half pack per day. Has smoked for the past 30 years consistently. He has tried patches, lozenges, and chantix in the past, all of which were unsuccessful.       Objective    BP (!) 157/111 (BP Location: Right arm, Patient Position: Sitting, Cuff Size: Adult Large)   Pulse 84   Temp 97.6  F (36.4  C)   Resp 20   Ht 1.81 m (5' 11.26\")   Wt 108 kg (238 lb 1.3 oz)   SpO2 96%   BMI 32.96 kg/m    Body mass index is 32.96 kg/m .  Physical Exam   GENERAL: alert and no distress  NECK: no adenopathy, no asymmetry, masses, or scars  RESP: lungs clear to auscultation - no rales, rhonchi or wheezes  CV: regular rate and rhythm, normal S1 S2, no S3 or S4, no murmur, click or rub, no peripheral edema  ABDOMEN: soft, nontender, no hepatosplenomegaly, no masses and bowel sounds normal  MS: no " gross musculoskeletal defects noted, no edema          Polo Mathews MD  [facility] - [place], [place] Residency, PGY-1

## 2024-04-27 LAB
ALBUMIN SERPL BCG-MCNC: 4.6 G/DL (ref 3.5–5.2)
ALP SERPL-CCNC: 64 U/L (ref 40–150)
ALT SERPL W P-5'-P-CCNC: 38 U/L (ref 0–70)
ANION GAP SERPL CALCULATED.3IONS-SCNC: 10 MMOL/L (ref 7–15)
AST SERPL W P-5'-P-CCNC: 29 U/L (ref 0–45)
BILIRUB SERPL-MCNC: 0.3 MG/DL
BUN SERPL-MCNC: 10.5 MG/DL (ref 6–20)
CALCIUM SERPL-MCNC: 9.7 MG/DL (ref 8.6–10)
CHLORIDE SERPL-SCNC: 103 MMOL/L (ref 98–107)
CHOLEST SERPL-MCNC: 244 MG/DL
CREAT SERPL-MCNC: 0.96 MG/DL (ref 0.67–1.17)
DEPRECATED HCO3 PLAS-SCNC: 23 MMOL/L (ref 22–29)
EGFRCR SERPLBLD CKD-EPI 2021: >90 ML/MIN/1.73M2
FASTING STATUS PATIENT QL REPORTED: ABNORMAL
GLUCOSE SERPL-MCNC: 97 MG/DL (ref 70–99)
HDLC SERPL-MCNC: 24 MG/DL
LDLC SERPL CALC-MCNC: ABNORMAL MG/DL
NONHDLC SERPL-MCNC: 220 MG/DL
POTASSIUM SERPL-SCNC: 4.9 MMOL/L (ref 3.4–5.3)
PROT SERPL-MCNC: 7.2 G/DL (ref 6.4–8.3)
SODIUM SERPL-SCNC: 136 MMOL/L (ref 135–145)
TRIGL SERPL-MCNC: 432 MG/DL
TSH SERPL DL<=0.005 MIU/L-ACNC: 1.11 UIU/ML (ref 0.3–4.2)

## 2024-04-29 RX ORDER — ATORVASTATIN CALCIUM 40 MG/1
40 TABLET, FILM COATED ORAL DAILY
Qty: 90 TABLET | Refills: 3 | Status: SHIPPED | OUTPATIENT
Start: 2024-04-29

## 2024-05-15 ENCOUNTER — TELEPHONE (OUTPATIENT)
Dept: GASTROENTEROLOGY | Facility: CLINIC | Age: 49
End: 2024-05-15
Payer: OTHER GOVERNMENT

## 2024-05-15 NOTE — TELEPHONE ENCOUNTER
"Endoscopy Scheduling Screen    Have you had a positive Covid test in the last 14 days?  No      What is your communication preference for Instructions and/or Bowel Prep?   MyChart      What insurance is in the chart?  Other:  [facility]    Ordering/Referring Provider: LINN MENDOZA   (If ordering provider performs procedure, schedule with ordering provider unless otherwise instructed. )    BMI: Estimated body mass index is 32.96 kg/m  as calculated from the following:    Height as of 4/26/24: 1.81 m (5' 11.26\").    Weight as of 4/26/24: 108 kg (238 lb 1.3 oz).     Sedation Ordered  moderate sedation.   If patient BMI > 50 do not schedule in [facility].    If patient BMI > 45 [facility]    Are you taking methadone or Suboxone?  No    Have you had difficulties, pain, or discomfort during past endoscopy procedures?  No    Are you taking any prescription medications for pain 3 or more times per week?   NO, No RN review required.      Do you have a history of malignant hyperthermia?  No      (Females) Are you currently pregnant?          Have you been diagnosed or told you have pulmonary hypertension?   No      Do you have an LVAD?  No      Have you been told you have moderate to severe sleep apnea?  No    Have you been told you have COPD, asthma, or any other lung disease?  No      Do you have any heart conditions?  No       Have you ever had or are you waiting for an organ transplant?  No. Continue scheduling, no site restrictions.      Have you had a stroke or transient ischemic attack (TIA aka \"mini stroke\" in the last 6 months?   No      Have you been diagnosed with or been told you have cirrhosis of the liver?   No      Are you currently on dialysis?   No      Do you need assistance transferring?   No    BMI: Estimated body mass index is 32.96 kg/m  as calculated from the following:    Height as of 4/26/24: 1.81 m (5' 11.26\").    Weight as of 4/26/24: 108 kg (238 lb 1.3 oz).     Is patients BMI > 40 and " scheduling location [facility]?  No      Do you take an injectable medication for weight loss or diabetes (excluding insulin)?  No    Do you take the medication Naltrexone?  No    Do you take blood thinners?  No       Prep   Are you currently on dialysis or do you have chronic kidney disease?  No    Do you have a diagnosis of diabetes?  No    Do you have a diagnosis of cystic fibrosis (CF)?  No    On a regular basis do you go 3 -5 days between bowel movements?  N/A    BMI > 40?      Preferred Pharmacy:    [id number] - [place], [place] - [address] [place] [address]  Phone: [phone] Fax: [phone]      Final Scheduling Details     Procedure scheduled  Upper endoscopy (EGD)    Surgeon:  JEREMI     Date of procedure:  5/22/24     Pre-OP / PAC:   No - Not required for this site.    Location  [facility] - Patient preference.    Sedation   MAC/Deep Sedation  per location      Patient Reminders:   You will receive a call from a Nurse to review instructions and health history.  This assessment must be completed prior to your procedure.  Failure to complete the Nurse assessment may result in the procedure being cancelled.      On the day of your procedure, please designate an adult(s) who can drive you home stay with you for the next 24 hours. The medicines used in the exam will make you sleepy. You will not be able to drive.      You cannot take public transportation, ride share services, or non-medical taxi service without a responsible caregiver.  Medical transport services are allowed with the requirement that a responsible caregiver will receive you at your destination.  We require that drivers and caregivers are confirmed prior to your procedure.

## 2024-05-21 ENCOUNTER — ANESTHESIA EVENT (OUTPATIENT)
Dept: SURGERY | Facility: AMBULATORY SURGERY CENTER | Age: 49
End: 2024-05-21
Payer: OTHER GOVERNMENT

## 2024-05-22 ENCOUNTER — HOSPITAL ENCOUNTER (OUTPATIENT)
Facility: AMBULATORY SURGERY CENTER | Age: 49
Discharge: HOME | End: 2024-05-22
Attending: SURGERY
Payer: OTHER GOVERNMENT

## 2024-05-22 ENCOUNTER — ANESTHESIA (OUTPATIENT)
Dept: SURGERY | Facility: AMBULATORY SURGERY CENTER | Age: 49
End: 2024-05-22
Payer: OTHER GOVERNMENT

## 2024-05-22 VITALS
RESPIRATION RATE: 16 BRPM | HEART RATE: 74 BPM | OXYGEN SATURATION: 99 % | DIASTOLIC BLOOD PRESSURE: 96 MMHG | SYSTOLIC BLOOD PRESSURE: 154 MMHG | TEMPERATURE: 97.2 F

## 2024-05-22 DIAGNOSIS — K44.9 HIATAL HERNIA: Primary | ICD-10-CM

## 2024-05-22 DIAGNOSIS — K21.9 GASTROESOPHAGEAL REFLUX DISEASE WITHOUT ESOPHAGITIS: ICD-10-CM

## 2024-05-22 LAB — UPPER GI ENDOSCOPY: NORMAL

## 2024-05-22 RX ORDER — NALOXONE HYDROCHLORIDE 0.4 MG/ML
0.1 INJECTION, SOLUTION INTRAMUSCULAR; INTRAVENOUS; SUBCUTANEOUS
Status: DISCONTINUED | OUTPATIENT
Start: 2024-05-22 | End: 2024-05-23 | Stop reason: HOSPADM

## 2024-05-22 RX ORDER — ONDANSETRON 2 MG/ML
4 INJECTION INTRAMUSCULAR; INTRAVENOUS EVERY 30 MIN PRN
Status: DISCONTINUED | OUTPATIENT
Start: 2024-05-22 | End: 2024-05-23 | Stop reason: HOSPADM

## 2024-05-22 RX ORDER — OXYCODONE HYDROCHLORIDE 10 MG/1
10 TABLET ORAL
Status: DISCONTINUED | OUTPATIENT
Start: 2024-05-22 | End: 2024-05-23 | Stop reason: HOSPADM

## 2024-05-22 RX ORDER — SODIUM CHLORIDE, SODIUM LACTATE, POTASSIUM CHLORIDE, CALCIUM CHLORIDE 600; 310; 30; 20 MG/100ML; MG/100ML; MG/100ML; MG/100ML
INJECTION, SOLUTION INTRAVENOUS CONTINUOUS
Status: DISCONTINUED | OUTPATIENT
Start: 2024-05-22 | End: 2024-05-23 | Stop reason: HOSPADM

## 2024-05-22 RX ORDER — DEXAMETHASONE SODIUM PHOSPHATE 4 MG/ML
4 INJECTION, SOLUTION INTRA-ARTICULAR; INTRALESIONAL; INTRAMUSCULAR; INTRAVENOUS; SOFT TISSUE
Status: DISCONTINUED | OUTPATIENT
Start: 2024-05-22 | End: 2024-05-23 | Stop reason: HOSPADM

## 2024-05-22 RX ORDER — FENTANYL CITRATE 0.05 MG/ML
25 INJECTION, SOLUTION INTRAMUSCULAR; INTRAVENOUS
Status: DISCONTINUED | OUTPATIENT
Start: 2024-05-22 | End: 2024-05-23 | Stop reason: HOSPADM

## 2024-05-22 RX ORDER — LIDOCAINE 40 MG/G
CREAM TOPICAL
Status: DISCONTINUED | OUTPATIENT
Start: 2024-05-22 | End: 2024-05-23 | Stop reason: HOSPADM

## 2024-05-22 RX ORDER — PROPOFOL 10 MG/ML
INJECTION, EMULSION INTRAVENOUS CONTINUOUS PRN
Status: DISCONTINUED | OUTPATIENT
Start: 2024-05-22 | End: 2024-05-22

## 2024-05-22 RX ORDER — LIDOCAINE HYDROCHLORIDE 20 MG/ML
INJECTION, SOLUTION INFILTRATION; PERINEURAL PRN
Status: DISCONTINUED | OUTPATIENT
Start: 2024-05-22 | End: 2024-05-22

## 2024-05-22 RX ORDER — PROPOFOL 10 MG/ML
INJECTION, EMULSION INTRAVENOUS PRN
Status: DISCONTINUED | OUTPATIENT
Start: 2024-05-22 | End: 2024-05-22

## 2024-05-22 RX ORDER — OXYCODONE HYDROCHLORIDE 5 MG/1
5 TABLET ORAL
Status: DISCONTINUED | OUTPATIENT
Start: 2024-05-22 | End: 2024-05-23 | Stop reason: HOSPADM

## 2024-05-22 RX ORDER — ONDANSETRON 4 MG/1
4 TABLET, ORALLY DISINTEGRATING ORAL EVERY 30 MIN PRN
Status: DISCONTINUED | OUTPATIENT
Start: 2024-05-22 | End: 2024-05-23 | Stop reason: HOSPADM

## 2024-05-22 RX ADMIN — PROPOFOL 80 MG: 10 INJECTION, EMULSION INTRAVENOUS at 12:20

## 2024-05-22 RX ADMIN — SODIUM CHLORIDE, SODIUM LACTATE, POTASSIUM CHLORIDE, CALCIUM CHLORIDE: 600; 310; 30; 20 INJECTION, SOLUTION INTRAVENOUS at 12:23

## 2024-05-22 RX ADMIN — PROPOFOL 40 MG: 10 INJECTION, EMULSION INTRAVENOUS at 12:22

## 2024-05-22 RX ADMIN — LIDOCAINE HYDROCHLORIDE 60 MG: 20 INJECTION, SOLUTION INFILTRATION; PERINEURAL at 12:20

## 2024-05-22 ASSESSMENT — LIFESTYLE VARIABLES: TOBACCO_USE: 1

## 2024-05-22 NOTE — ANESTHESIA POSTPROCEDURE EVALUATION
Patient: Valdemar Rivero Jr.    Procedure: Procedure(s):  ESOPHAGOGASTRODUODENOSCOPY, WITH BIOPSY       Anesthesia Type:  MAC    Note:  Disposition: Outpatient   Postop Pain Control: Uneventful            Sign Out: Well controlled pain   PONV: No   Neuro/Psych: Uneventful            Sign Out: Acceptable/Baseline neuro status   Airway/Respiratory: Uneventful            Sign Out: Acceptable/Baseline resp. status   CV/Hemodynamics: Uneventful            Sign Out: Acceptable CV status; No obvious hypovolemia; No obvious fluid overload   Other NRE: NONE   DID A NON-ROUTINE EVENT OCCUR? No           Last vitals:  Vitals Value Taken Time   /96 05/22/24 1300   Temp 97.2  F (36.2  C) 05/22/24 1232   Pulse 76 05/22/24 1300   Resp 16 05/22/24 1245   SpO2 99 % 05/22/24 1300   Vitals shown include unfiled device data.    Electronically Signed By: Kale Patel MD  May 22, 2024  1:14 PM

## 2024-05-22 NOTE — ANESTHESIA CARE TRANSFER NOTE
Patient: Valdemar Rivero Jr.    Procedure: Procedure(s):  ESOPHAGOGASTRODUODENOSCOPY, WITH BIOPSY       Diagnosis: Gastroesophageal reflux disease without esophagitis [K21.9]  Diagnosis Additional Information: No value filed.    Anesthesia Type:   MAC     Note:    Oropharynx: oropharynx clear of all foreign objects and spontaneously breathing  Level of Consciousness: awake  Oxygen Supplementation: room air    Independent Airway: airway patency satisfactory and stable  Dentition: dentition unchanged  Vital Signs Stable: post-procedure vital signs reviewed and stable  Report to RN Given: handoff report given  Patient transferred to: Phase II    Handoff Report: Identifed the Patient, Identified the Reponsible Provider, Reviewed the pertinent medical history, Discussed the surgical course, Reviewed Intra-OP anesthesia mangement and issues during anesthesia, Set expectations for post-procedure period and Allowed opportunity for questions and acknowledgement of understanding  Vitals:  Vitals Value Taken Time   /73 05/22/24 1233   Temp 97.2  F (36.2  C) 05/22/24 1232   Pulse 84 05/22/24 1233   Resp 16 05/22/24 1232   SpO2 94 % 05/22/24 1233   Vitals shown include unfiled device data.    Electronically Signed By: MEKHI Gant CRNA  May 22, 2024  12:36 PM

## 2024-05-22 NOTE — DISCHARGE INSTRUCTIONS
If you have any questions or concerns regarding your procedure, please contact Dr. Olson, his office number is [phone].    Upper GI Endoscopy: What to Expect at Home  Your Recovery  You had an upper GI endoscopy. Your doctor used a thin, lighted tube that bends to look at the inside of your esophagus, your stomach, and the first part of the small intestine, called the duodenum.  After you have an endoscopy, you will stay at the hospital or clinic for 1 to 2 hours. This will allow the medicine to wear off. You will be able to go home after your doctor or nurse checks to make sure that you're not having any problems.  You may have to stay overnight if you had treatment during the test. You may have a sore throat for a day or two after the test.  This care sheet gives you a general idea about what to expect after the test.  How can you care for yourself at home?  Activity   Rest as much as you need to after you go home.  You should be able to go back to your usual activities the day after the test.  Diet   Follow your doctor's directions for eating after the test.  Drink plenty of fluids (unless your doctor has told you not to).  Medications   If you have a sore throat the day after the test, use an over-the-counter spray to numb your throat.  Follow-up care is a key part of your treatment and safety. Be sure to make and go to all appointments, and call your doctor if you are having problems. It's also a good idea to know your test results and keep a list of the medicines you take.  When should you call for help?   Call 911 anytime you think you may need emergency care. For example, call if:    You passed out (lost consciousness).     You have trouble breathing.     You pass maroon or bloody stools.   Call your doctor now or seek immediate medical care if:    You have pain that does not get better after your take pain medicine.     You have new or worse belly pain.     You have blood in your stools.     You are  "sick to your stomach and cannot keep fluids down.     You have a fever.     You cannot pass stools or gas.   Watch closely for changes in your health, and be sure to contact your doctor if:    Your throat still hurts after a day or two.     You do not get better as expected.   Where can you learn more?  Go to https://www.[employer].net/patiented  Enter J454 in the search box to learn more about \"Upper GI Endoscopy: What to Expect at Home.\"  Current as of: October 19, 2023               Content Version: 14.0    299[address].   Care instructions adapted under license by your healthcare professional. If you have questions about a medical condition or this instruction, always ask your healthcare professional. [employer] [address] disclaims any warranty or liability for your use of this information.    Hiatal Hernia: Care Instructions  Your Care Instructions  A hiatal hernia occurs when part of the stomach bulges into the chest cavity.  A hiatal hernia may allow stomach acid and juices to back up into the esophagus (acid reflux). This can cause a feeling of burning, warmth, heat, or pain behind the breastbone. This feeling may often occur after you eat, soon after you lie down, or when you bend forward, and it may come and go. You also may have a sour taste in your mouth. These symptoms are commonly known as heartburn or reflux. But not all hiatal hernias cause symptoms.  Follow-up care is a key part of your treatment and safety. Be sure to make and go to all appointments, and call your doctor if you are having problems. It's also a good idea to know your test results and keep a list of the medicines you take.  How can you care for yourself at home?  Take your medicines exactly as prescribed. Call your doctor if you think you are having a problem with your medicine.  Do not take aspirin or other nonsteroidal anti-inflammatory drugs (NSAIDs), such as ibuprofen (Advil, Motrin) or naproxen (Aleve), " unless your doctor says it is okay. Ask your doctor what you can take for pain.  Your doctor may recommend over-the-counter medicine. For mild or occasional indigestion, antacids such as Tums, Maalox, or Mylanta may help. Your doctor also may recommend over-the-counter acid reducers, such as famotidine (Pepcid AC), cimetidine (Tagamet HB), or omeprazole (Prilosec). Read and follow all instructions on the label. If you use these medicines often, talk with your doctor.  Change your eating habits.  It's best to eat several small meals instead of two or three large meals.  After you eat, wait 2 to 3 hours before you lie down. Snacking close to bedtime isn't a good idea.  Avoid foods that make your symptoms worse. These may include chocolate, mint, alcohol, pepper, spicy foods, high-fat foods, or drinks with caffeine in them, such as tea, coffee, hernan, or energy drinks. If your symptoms are worse after you eat a certain food, you may want to stop eating it to see if your symptoms get better.  Try to quit smoking or chewing tobacco, or cut back as much as you can. If you need help quitting, talk to your doctor about quit-tobacco programs and medicines. These can increase your chances of quitting for good.  If you get heartburn at night, raise the head of your bed 6 to 8 inches by putting the frame on blocks or placing a foam wedge under the head of your mattress. (Adding extra pillows does not work.)  Do not wear tight clothing around your middle.  Lose weight if you need to. Losing just 5 to 10 pounds can help.  When should you call for help?  Call 911 anytime you think you may need emergency care. For example, call if:    You passed out (lose consciousness).     You vomit blood or what looks like coffee grounds.     You pass maroon or very bloody stools.     You have severe belly pain.   Call your doctor now or seek immediate medical care if:    You have new or worse belly pain.     Your stools are black and look like  "tar or have streaks of blood.   Watch closely for changes in your health, and be sure to contact your doctor if:    You are vomiting.     You have new or worse symptoms of indigestion.     You have trouble or pain swallowing.     You are losing weight.     You do not get better as expected.   Where can you learn more?  Go to https://www.[employer].net/patiented  Enter T074 in the search box to learn more about \"Hiatal Hernia: Care Instructions.\"  Current as of: October 19, 2023               Content Version: 14.0    [id number] [employer].   Care instructions adapted under license by your healthcare professional. If you have questions about a medical condition or this instruction, always ask your healthcare professional. [employer] disclaims any warranty or liability for your use of this information.    [facility]   Adult Discharge Orders & Instructions     For 24 hours after surgery    Get plenty of rest.  A responsible adult must stay with you for at least 24 hours after you leave the hospital.   Do not drive or use heavy equipment.  If you have weakness or tingling, don't drive or use heavy equipment until this feeling goes away.  Do not drink alcohol.  Avoid strenuous or risky activities.  Ask for help when climbing stairs.   You may feel lightheaded.  IF so, sit for a few minutes before standing.  Have someone help you get up.   If you have nausea (feel sick to your stomach): Drink only clear liquids such as apple juice, ginger ale, broth or 7-Up.  Rest may also help.  Be sure to drink enough fluids.  Move to a regular diet as you feel able.  You may have a slight fever. Call the doctor if your fever is over 100 F (37.7 C) (taken under the tongue) or lasts longer than 24 hours.  You may have a dry mouth, a sore throat, muscle aches or trouble sleeping.  These should go away after 24 hours.  Do not make important or legal decisions.   Call your doctor for any of the " followin.  Signs of infection (fever, growing tenderness at the surgery site, a large amount of drainage or bleeding, severe pain, foul-smelling drainage, redness, swelling).    2. It has been over 8 to 10 hours since surgery and you are still not able to urinate (pass water).    3.  Headache for over 24 hours.

## 2024-05-22 NOTE — ANESTHESIA PREPROCEDURE EVALUATION
Anesthesia Pre-Procedure Evaluation    Patient: Valdemar Rivero Jr.   MRN: [id number] : 1975        Procedure : Procedure(s):  ESOPHAGOGASTRODUODENOSCOPY, WITH BIOPSY          Past Medical History:   Diagnosis Date    Gastroesophageal reflux disease     History of angina       Past Surgical History:   Procedure Laterality Date    ARTHROSCOPIC REPAIR ACL Right     COLONOSCOPY N/A 4/15/2024    Procedure: COLONOSCOPY;  Surgeon: Horace Bell MD;  Location: [place] [facility]    DENTAL SURGERY      Widsom teeth removal      No Known Allergies   Social History     Tobacco Use    Smoking status: Every Day     Current packs/day: 1.00     Average packs/day: 1 pack/day for 35.4 years (35.4 ttl pk-yrs)     Types: Cigarettes     Start date:     Smokeless tobacco: Never   Substance Use Topics    Alcohol use: Never     Comment: Very rarely      Wt Readings from Last 1 Encounters:   24 108 kg (238 lb 1.3 oz)        Anesthesia Evaluation   Pt has had prior anesthetic. Type: General and MAC.    No history of anesthetic complications       ROS/MED HX  ENT/Pulmonary:  - neg pulmonary ROS   (+)                tobacco use, Current use,                       Neurologic:  - neg neurologic ROS     Cardiovascular:  - neg cardiovascular ROS     METS/Exercise Tolerance: >4 METS    Hematologic:  - neg hematologic  ROS     Musculoskeletal:  - neg musculoskeletal ROS     GI/Hepatic:  - neg GI/hepatic ROS   (+) GERD,                   Renal/Genitourinary:  - neg Renal ROS     Endo:  - neg endo ROS   (+)               Obesity (bmi 33),       Psychiatric/Substance Use:  - neg psychiatric ROS     Infectious Disease:  - neg infectious disease ROS     Malignancy:  - neg malignancy ROS     Other:  - neg other ROS          Physical Exam    Airway        Mallampati: II   TM distance: > 3 FB   Neck ROM: full   Mouth opening: > 3 cm    Respiratory Devices and Support         Dental       (+) Multiple crowns, permanant  "bridges      Cardiovascular   cardiovascular exam normal          Pulmonary   pulmonary exam normal                OUTSIDE LABS:  CBC:   Lab Results   Component Value Date    HGB 16.5 02/09/2023     BMP:   Lab Results   Component Value Date     04/26/2024     02/09/2023    POTASSIUM 4.9 04/26/2024    POTASSIUM 4.6 02/09/2023    CHLORIDE 103 04/26/2024    CHLORIDE 108 02/09/2023    CO2 23 04/26/2024    CO2 29 02/09/2023    BUN 10.5 04/26/2024    BUN 14 02/09/2023    CR 0.96 04/26/2024    CR 0.95 02/09/2023    GLC 97 04/26/2024    GLC 91 02/09/2023     COAGS: No results found for: \"PTT\", \"INR\", \"FIBR\"  POC: No results found for: \"BGM\", \"HCG\", \"HCGS\"  HEPATIC:   Lab Results   Component Value Date    ALBUMIN 4.6 04/26/2024    PROTTOTAL 7.2 04/26/2024    ALT 38 04/26/2024    AST 29 04/26/2024    ALKPHOS 64 04/26/2024    BILITOTAL 0.3 04/26/2024     OTHER:   Lab Results   Component Value Date    A1C 5.4 04/26/2024    FAITH 9.7 04/26/2024    TSH 1.11 04/26/2024       Anesthesia Plan    ASA Status:  2    NPO Status:  NPO Appropriate    Anesthesia Type: MAC.     - Reason for MAC: immobility needed, straight local not clinically adequate   Induction: Propofol.   Maintenance: TIVA.        Consents    Anesthesia Plan(s) and associated risks, benefits, and realistic alternatives discussed. Questions answered and patient/representative(s) expressed understanding.     - Discussed:     - Discussed with:  Patient            Postoperative Care    Pain management: Multi-modal analgesia.   PONV prophylaxis: Ondansetron (or other 5HT-3), Dexamethasone or Solumedrol     Comments:    Other Comments: propofol    Reviewed anesthetic options and risks. Patient agrees to proceed.            Kale Patel MD    I have reviewed the pertinent notes and labs in the chart from the past 30 days and (re)examined the patient.  Any updates or changes from those notes are reflected in this note.              # Obesity: Estimated body mass " "index is 32.96 kg/m  as calculated from the following:    Height as of 4/26/24: 1.81 m (5' 11.26\").    Weight as of 4/26/24: 108 kg (238 lb 1.3 oz).      " none

## 2024-05-22 NOTE — H&P
General Surgery H&P  Valdemar Rivero Jr. MRN# [id number]   Age/Sex: 49 year old male YOB: 1975     Reason for visit: EGD       Referring physician: DR. Harris                   Assessment and Plan:   Assessment:  1.  History of GERD  2.  Concern for either hernia      Plan:  -To the OR today for EGD  - The risks and benefits of the procedure were explained detail to the patient. The risks include infection, bleeding, damage to the surrounding structures. Patient verbalized understanding provided consent to undergo the procedure above.            Chief Complaint:   Here for surgery     History is obtained from the patient    HPI:   Valdemar Rivero Jr. is a 49 year old male who presents today for EGD.  Patient was seen by primary care team.  There was concerns for history of GERD and hiatal hernia.  Patient has no acute changes to medical history since last time that we met.          Past Medical History:     Past Medical History:   Diagnosis Date    Gastroesophageal reflux disease     History of angina               Past Surgical History:     Past Surgical History:   Procedure Laterality Date    ARTHROSCOPIC REPAIR ACL Right     COLONOSCOPY N/A 4/15/2024    Procedure: COLONOSCOPY;  Surgeon: Horace Bell MD;  Location: [facility]    DENTAL SURGERY      Widsom teeth removal             Social History:    reports that he has been smoking cigarettes. He has never used smokeless tobacco. He reports current drug use. Drug: Marijuana. He reports that he does not drink alcohol.           Family History:   History reviewed. No pertinent family history.           Allergies:   No Known Allergies           Medications:     Prior to Admission medications    Medication Sig Start Date End Date Taking? Authorizing Provider   albuterol (PROAIR HFA/PROVENTIL HFA/VENTOLIN HFA) 108 (90 Base) MCG/ACT inhaler INHALE 2 PUFFS INTO THE LUNGS EVERY 6 HOURS AS NEEDED FOR SHORTNESS OF BREATH OR WHEEZING OR COUGH 4/5/23   Yes Rose Harper MD   atorvastatin (LIPITOR) 40 MG tablet Take 1 tablet (40 mg) by mouth daily 4/29/24  Yes Graham Perez MD   famotidine (PEPCID) 40 MG tablet Take 1 tablet (40 mg) by mouth daily 4/26/24  Yes Graham Perez MD   fluticasone (FLONASE) 50 MCG/ACT nasal spray Spray 1 spray into both nostrils daily 7/6/22  Yes Ariel Castellon MD   losartan (COZAAR) 25 MG tablet Take 1 tablet (25 mg) by mouth daily 4/26/24  Yes Graham Perez MD   nicotine (NICODERM CQ) 21 MG/24HR 24 hr patch Place 1 patch onto the skin every 24 hours 7/6/22  Yes Ariel Castellon MD   nicotine (NICORETTE) 4 MG lozenge Place 1 lozenge (4 mg) inside cheek every hour as needed for smoking cessation 7/6/22  Yes Ariel Castellon MD              Review of Systems:   A 12 point Review of Systems is negative other than noted in the HPI            Physical Exam:   No data found.     No intake or output data in the 24 hours ending 05/22/24 0933   Constitutional:   awake, alert, cooperative, no apparent distress, and appears stated age       Eyes:   PERRL, conjunctiva/corneas clear, EOM's intact; no scleral edema or icterus noted        ENT:   Normocephalic, without obvious abnormality, atraumatic, Lips, mucosa, and tongue normal        Hematologic / Lymphatic:   No lymphadenopathy       Lungs:   Normal respiratory effort, no accessory muscle use       Cardiovascular:   Regular rate and rhythm       Abdomen:   Soft, nondistended, nontender to palpation       Musculoskeletal:   No obvious swelling, bruising or deformity       Skin:   Skin color and texture normal for patient, no rashes or lesions              Data:            Blanco Olson DO  General Surgeon  [facility]  Surgery [facility] - [address] [address]?  Office: [phone]  Employed by - [employer]  Pager: [phone]

## 2024-05-24 DIAGNOSIS — K29.70 GASTRITIS WITHOUT BLEEDING, UNSPECIFIED CHRONICITY, UNSPECIFIED GASTRITIS TYPE: Primary | ICD-10-CM

## 2024-05-24 RX ORDER — OMEPRAZOLE 40 MG/1
40 CAPSULE, DELAYED RELEASE ORAL DAILY
Qty: 90 CAPSULE | Refills: 3 | Status: SHIPPED | OUTPATIENT
Start: 2024-05-24

## 2024-05-29 DIAGNOSIS — K44.9 HIATAL HERNIA: ICD-10-CM

## 2024-05-29 DIAGNOSIS — K29.70 GASTRITIS WITHOUT BLEEDING, UNSPECIFIED CHRONICITY, UNSPECIFIED GASTRITIS TYPE: Primary | ICD-10-CM

## 2024-06-03 ENCOUNTER — HOSPITAL ENCOUNTER (OUTPATIENT)
Facility: AMBULATORY SURGERY CENTER | Age: 49
End: 2024-06-03
Attending: INTERNAL MEDICINE
Payer: OTHER GOVERNMENT

## 2024-06-03 ENCOUNTER — TELEPHONE (OUTPATIENT)
Dept: GASTROENTEROLOGY | Facility: CLINIC | Age: 49
End: 2024-06-03
Payer: OTHER GOVERNMENT

## 2024-06-03 NOTE — TELEPHONE ENCOUNTER
"Endoscopy Scheduling Screen     Have you had a positive Covid test in the last 14 days?  No        What is your communication preference for Instructions and/or Bowel Prep?   MyChart        What insurance is in the chart?  Other:  [facility]     Ordering/Referring Provider: LINN MENDOZA   (If ordering provider performs procedure, schedule with ordering provider unless otherwise instructed. )     BMI: Estimated body mass index is 32.96 kg/m  as calculated from the following:    Height as of 4/26/24: 1.81 m (5' 11.26\").    Weight as of 4/26/24: 108 kg (238 lb 1.3 oz).      Sedation Ordered  moderate sedation.   If patient BMI > 50 do not schedule in [facility].     If patient BMI > 45 do not schedule at [facility].     Are you taking methadone or Suboxone?  No     Have you had difficulties, pain, or discomfort during past endoscopy procedures?  No     Are you taking any prescription medications for pain 3 or more times per week?   NO, No RN review required.        Do you have a history of malignant hyperthermia?  No        (Females) Are you currently pregnant?                 Have you been diagnosed or told you have pulmonary hypertension?   No        Do you have an LVAD?  No        Have you been told you have moderate to severe sleep apnea?  No     Have you been told you have COPD, asthma, or any other lung disease?  No        Do you have any heart conditions?  No         Have you ever had or are you waiting for an organ transplant?  No. Continue scheduling, no site restrictions.        Have you had a stroke or transient ischemic attack (TIA aka \"mini stroke\" in the last 6 months?           No        Have you been diagnosed with or been told you have cirrhosis of the liver?             No        Are you currently on dialysis?   No        Do you need assistance transferring?              No     BMI: Estimated body mass index is 32.96 kg/m  as calculated from the following:    Height as of 4/26/24: 1.81 m (5' 11.26\").    Weight " as of 4/26/24: 108 kg (238 lb 1.3 oz).      Is patients BMI > 40 and scheduling location [facility]?  No        Do you take an injectable medication for weight loss or diabetes (excluding insulin)?  No     Do you take the medication Naltrexone?  No     Do you take blood thinners?  No         Prep   Are you currently on dialysis or do you have chronic kidney disease?  No     Do you have a diagnosis of diabetes?  No     Do you have a diagnosis of cystic fibrosis (CF)?  No     On a regular basis do you go 3 -5 days between bowel movements?  N/A     BMI > 40?        Preferred Pharmacy:     [id number] - [place], [place] - [address] [place] [address] & [place] [address] [place] [address]  Phone: [phone] Fax: [phone]        Final Scheduling Details       Procedure scheduled  Upper endoscopy (EGD)     Surgeon:       Date of procedure:  9/25/24     Pre-OP / PAC:   No - Not required for this site.     Location  [facility] [place] [facility]     Sedation            MAC/Deep Sedation - per patient preference        Patient Reminders:   You will receive a call from a Nurse to review instructions and health history.  This assessment must be completed prior to your procedure.  Failure to complete the Nurse assessment may result in the procedure being cancelled.      On the day of your procedure, please designate an adult(s) who can drive you home stay with you for the next 24 hours. The medicines used in the exam will make you sleepy. You will not be able to drive.      You cannot take public transportation, ride share services, or non-medical taxi service without a responsible caregiver.  Medical transport services are allowed with the requirement that a responsible caregiver will receive you at your destination.  We require that drivers and caregivers are confirmed prior to your procedure.

## 2024-06-10 ENCOUNTER — OFFICE VISIT (OUTPATIENT)
Dept: FAMILY MEDICINE | Facility: CLINIC | Age: 49
End: 2024-06-10
Payer: OTHER GOVERNMENT

## 2024-06-10 VITALS
OXYGEN SATURATION: 97 % | HEIGHT: 71 IN | SYSTOLIC BLOOD PRESSURE: 157 MMHG | WEIGHT: 241 LBS | BODY MASS INDEX: 33.74 KG/M2 | TEMPERATURE: 98.3 F | RESPIRATION RATE: 20 BRPM | DIASTOLIC BLOOD PRESSURE: 101 MMHG | HEART RATE: 85 BPM

## 2024-06-10 DIAGNOSIS — E78.2 MIXED HYPERLIPIDEMIA: Primary | ICD-10-CM

## 2024-06-10 DIAGNOSIS — I10 ESSENTIAL HYPERTENSION: ICD-10-CM

## 2024-06-10 DIAGNOSIS — R05.3 CHRONIC COUGH: ICD-10-CM

## 2024-06-10 DIAGNOSIS — K44.9 HIATAL HERNIA: ICD-10-CM

## 2024-06-10 DIAGNOSIS — R00.2 HEART PALPITATIONS: ICD-10-CM

## 2024-06-10 DIAGNOSIS — R07.89 OTHER CHEST PAIN: ICD-10-CM

## 2024-06-10 PROBLEM — K22.6 MALLORY-WEISS TEAR: Status: ACTIVE | Noted: 2024-06-10

## 2024-06-10 PROCEDURE — 99214 OFFICE O/P EST MOD 30 MIN: CPT | Mod: GC

## 2024-06-10 RX ORDER — LOSARTAN POTASSIUM 25 MG/1
50 TABLET ORAL DAILY
Qty: 90 TABLET | Refills: 3 | Status: SHIPPED | OUTPATIENT
Start: 2024-06-10 | End: 2024-06-10

## 2024-06-10 RX ORDER — LOSARTAN POTASSIUM 50 MG/1
50 TABLET ORAL DAILY
Qty: 90 TABLET | Refills: 3 | Status: SHIPPED | OUTPATIENT
Start: 2024-06-10

## 2024-06-10 NOTE — Clinical Note
Can you add your interpretation of this patient's EKG to your note and route back to me? I missed that this wasn't in there before. Thanks, OLAYINKA

## 2024-06-10 NOTE — PROGRESS NOTES
Assessment & Plan     Heart palpitations  Chest Pain  Worsening chest pain over the past few month. States it is non-exertional but worsened by life stressors. With constellation of risk factors (hyperlipidemia, hypertension, male, smoking history), will obtain stress echocardiogram in the future. ECG in clinic normal sinus rhythm with possible age-indeterminate septal infarct, however no prior to compare to. Will await stress echocardiogram to further characterize heart function.   - Echocardiogram Exercise Stress  - EKG 12-lead complete in clinic today    Hiatal hernia  GERD symptoms improved after adding omeprazole. Undergoing UGI series tomorrow, further management dependent on results.     Essential hypertension  Still elevated despite 25 mg losartan daily. Will increase to 50 mg daily, anticipate he may likely need higher dose and/or addition of second antihypertensive.   - losartan (COZAAR) 25 mg twice daily  - Basic metabolic panel in two weeks   - CBC with platelets in two weeks     Chronic cough  History of cough that has worsened in the past few months and required albuterol inhaler a few times per week. No history of asthma and clear lungs on exam today. With 35 PPD smoking history, suspicion for COPD is higher. GERD may also be playing a part.   - Pulmonary Function Test    Mixed hyperlipidemia  Doing well with atorvastatin 40 mg. Repeat lipids in 1 year.     Subjective   Yony is a 49 year old, presenting for the following health issues:  #Elevated blood pressure   Elevated blood pressure readings with his home cuff and at the dentist, usually 150's systolic. Denies any headaches, visions changes, leg swelling. Started on losartan 25 mg at last visit in April.    #Cough   Has had chronic cough that worsened in the last two months. Still smoking pack per day. History of cough and wheezes, now using his albuterol inhaler about twice per week. No formal PFTs. No history of asthma.     #Heart Palpitations  "  Having heart palpitations and intermittent chest pain for the last few months. States it is worsened by stress. Not worsened by exercise or exertion. Initially thought to be due to GERD/heartburn, however he states his heartburn has resolved after starting omeprazole but is still having ongoing chest pain/palpitations.       Objective    BP (!) 157/101   Pulse 85   Temp 98.3  F (36.8  C)   Resp 20   Ht 1.803 m (5' 11\")   Wt 109.3 kg (241 lb)   SpO2 97%   BMI 33.61 kg/m    Body mass index is 33.61 kg/m .  Physical Exam   GENERAL: alert and no distress  NECK: no adenopathy, no asymmetry, masses, or scars  RESP: lungs clear to auscultation - no rales, rhonchi or wheezes  CV: regular rate and rhythm, normal S1 S2, no S3 or S4, no murmur, click or rub, no peripheral edema  ABDOMEN: soft, nontender, no hepatosplenomegaly, no masses and bowel sounds normal  MS: no gross musculoskeletal defects noted, no edema          Polo Mathews MD  [facility], PGY-1  "

## 2024-06-10 NOTE — PROGRESS NOTES
Preceptor Attestation:  Patient's case reviewed and discussed with the resident, ALFONSO MARTINEZ MD, and I personally evaluated the patient. I have also reviewed the EKG. I agree with written assessment and plan of care.    Supervising Physician:  Lili Will MD   [facility]

## 2024-06-11 ENCOUNTER — HOSPITAL ENCOUNTER (OUTPATIENT)
Dept: RADIOLOGY | Facility: HOSPITAL | Age: 49
Discharge: HOME | End: 2024-06-11
Attending: FAMILY MEDICINE | Admitting: FAMILY MEDICINE
Payer: OTHER GOVERNMENT

## 2024-06-11 DIAGNOSIS — K44.9 HIATAL HERNIA: ICD-10-CM

## 2024-06-11 PROCEDURE — 74246 X-RAY XM UPR GI TRC 2CNTRST: CPT

## 2024-06-11 PROCEDURE — 250N000013 HC RX MED GY IP 250 OP 250 PS 637: Performed by: FAMILY MEDICINE

## 2024-06-11 RX ADMIN — ANTACID/ANTIFLATULENT 4 G: 380; 550; 10; 10 GRANULE, EFFERVESCENT ORAL at 08:29

## 2024-06-20 ENCOUNTER — OFFICE VISIT (OUTPATIENT)
Dept: PULMONOLOGY | Facility: CLINIC | Age: 49
End: 2024-06-20
Payer: OTHER GOVERNMENT

## 2024-06-20 DIAGNOSIS — R05.3 CHRONIC COUGH: ICD-10-CM

## 2024-06-20 LAB — HGB BLD-MCNC: 16.8 G/DL

## 2024-06-20 PROCEDURE — 94375 RESPIRATORY FLOW VOLUME LOOP: CPT | Mod: 26 | Performed by: INTERNAL MEDICINE

## 2024-06-20 PROCEDURE — 94729 DIFFUSING CAPACITY: CPT | Mod: 26 | Performed by: INTERNAL MEDICINE

## 2024-06-20 PROCEDURE — 85018 HEMOGLOBIN: CPT | Mod: QW | Performed by: INTERNAL MEDICINE

## 2024-06-20 PROCEDURE — 94726 PLETHYSMOGRAPHY LUNG VOLUMES: CPT | Mod: 26 | Performed by: INTERNAL MEDICINE

## 2024-06-21 LAB
DLCOCOR-%PRED-PRE: 75 %
DLCOCOR-PRE: 22.89 ML/MIN/MMHG
DLCOUNC-%PRED-PRE: 79 %
DLCOUNC-PRE: 24.19 ML/MIN/MMHG
DLCOUNC-PRED: 30.32 ML/MIN/MMHG
ERV-%PRED-PRE: 81 %
ERV-PRE: 1.41 L
ERV-PRED: 1.73 L
EXPTIME-PRE: 6.22 SEC
FEF2575-%PRED-PRE: 127 %
FEF2575-PRE: 4.47 L/SEC
FEF2575-PRED: 3.51 L/SEC
FEFMAX-%PRED-PRE: 88 %
FEFMAX-PRE: 8.9 L/SEC
FEFMAX-PRED: 10.08 L/SEC
FEV1-%PRED-PRE: 110 %
FEV1-PRE: 4.2 L
FEV1FEV6-PRE: 82 %
FEV1FEV6-PRED: 81 %
FEV1FVC-PRE: 83 %
FEV1FVC-PRED: 80 %
FEV1SVC-PRE: 83 %
FEV1SVC-PRED: 78 %
FIFMAX-PRE: 9.36 L/SEC
FRCPLETH-%PRED-PRE: 135 %
FRCPLETH-PRE: 4.83 L
FRCPLETH-PRED: 3.57 L
FVC-%PRED-PRE: 105 %
FVC-PRE: 5.04 L
FVC-PRED: 4.77 L
IC-%PRED-PRE: 105 %
IC-PRE: 3.66 L
IC-PRED: 3.46 L
RVPLETH-%PRED-PRE: 153 %
RVPLETH-PRE: 3.42 L
RVPLETH-PRED: 2.22 L
TLCPLETH-%PRED-PRE: 115 %
TLCPLETH-PRE: 8.48 L
TLCPLETH-PRED: 7.33 L
VA-%PRED-PRE: 102 %
VA-PRE: 7 L
VC-%PRED-PRE: 103 %
VC-PRE: 5.07 L
VC-PRED: 4.89 L

## 2024-06-24 ENCOUNTER — ANCILLARY PROCEDURE (OUTPATIENT)
Dept: GENERAL RADIOLOGY | Facility: CLINIC | Age: 49
End: 2024-06-24
Attending: FAMILY MEDICINE
Payer: OTHER GOVERNMENT

## 2024-06-24 ENCOUNTER — OFFICE VISIT (OUTPATIENT)
Dept: FAMILY MEDICINE | Facility: CLINIC | Age: 49
End: 2024-06-24
Payer: OTHER GOVERNMENT

## 2024-06-24 VITALS
HEIGHT: 72 IN | RESPIRATION RATE: 20 BRPM | WEIGHT: 243 LBS | OXYGEN SATURATION: 96 % | TEMPERATURE: 97.8 F | SYSTOLIC BLOOD PRESSURE: 155 MMHG | DIASTOLIC BLOOD PRESSURE: 114 MMHG | BODY MASS INDEX: 32.91 KG/M2 | HEART RATE: 95 BPM

## 2024-06-24 DIAGNOSIS — R00.2 HEART PALPITATIONS: Primary | ICD-10-CM

## 2024-06-24 DIAGNOSIS — R05.3 CHRONIC COUGH: ICD-10-CM

## 2024-06-24 DIAGNOSIS — F41.9 ANXIETY: ICD-10-CM

## 2024-06-24 DIAGNOSIS — I10 ESSENTIAL HYPERTENSION: ICD-10-CM

## 2024-06-24 LAB
ERYTHROCYTE [DISTWIDTH] IN BLOOD BY AUTOMATED COUNT: 13.2 % (ref 10–15)
HCT VFR BLD AUTO: 50.1 % (ref 40–53)
HGB BLD-MCNC: 16.2 G/DL (ref 13.3–17.7)
MCH RBC QN AUTO: 28.1 PG (ref 26.5–33)
MCHC RBC AUTO-ENTMCNC: 32.3 G/DL (ref 31.5–36.5)
MCV RBC AUTO: 87 FL (ref 78–100)
PLATELET # BLD AUTO: 218 10E3/UL (ref 150–450)
RBC # BLD AUTO: 5.77 10E6/UL (ref 4.4–5.9)
WBC # BLD AUTO: 10.9 10E3/UL (ref 4–11)

## 2024-06-24 PROCEDURE — 85027 COMPLETE CBC AUTOMATED: CPT

## 2024-06-24 PROCEDURE — 71046 X-RAY EXAM CHEST 2 VIEWS: CPT | Mod: TC | Performed by: RADIOLOGY

## 2024-06-24 PROCEDURE — 93246 EXT ECG>7D<15D RECORDING: CPT

## 2024-06-24 PROCEDURE — 36415 COLL VENOUS BLD VENIPUNCTURE: CPT

## 2024-06-24 PROCEDURE — 80048 BASIC METABOLIC PNL TOTAL CA: CPT

## 2024-06-24 PROCEDURE — 99214 OFFICE O/P EST MOD 30 MIN: CPT | Mod: GC

## 2024-06-24 RX ORDER — LOSARTAN POTASSIUM AND HYDROCHLOROTHIAZIDE 12.5; 5 MG/1; MG/1
1 TABLET ORAL DAILY
Qty: 90 TABLET | Refills: 3 | Status: SHIPPED | OUTPATIENT
Start: 2024-06-24

## 2024-06-24 RX ORDER — HYDROXYZINE HYDROCHLORIDE 25 MG/1
25 TABLET, FILM COATED ORAL 3 TIMES DAILY PRN
Qty: 90 TABLET | Refills: 0 | Status: SHIPPED | OUTPATIENT
Start: 2024-06-24

## 2024-06-24 NOTE — PROGRESS NOTES
Valdemar LAST Rivero Jr. arrived here on 6/24/2024 2:53 PM for 8-14 Days  Zio monitor placement per ordering provider Bisi for the diagnosis heart.  Patient s skin was prepped per protocol. Dr. Wood is the supervising MD.  Zio monitor was placed.  Instructions were reviewed with and given to the patient.  Patient verbalized understanding of wear, troubleshooting and monitor return instructions.

## 2024-06-24 NOTE — PROGRESS NOTES
Assessment & Plan     Essential hypertension  Blood pressure still elevated today despite increase to 50 mg losartan daily. Will switch to losartan-hydrochlorothiazide today as second agent indicated and he prefers to take less pills if possible. Likely combination of sedentary lifestyle, smoking, anxiety/stress, and caffeine intake contributing to high blood pressure. Discussed these lifestyle changes in clinic today.   - Basic metabolic panel pending  - CBC with platelets  - losartan-hydrochlorothiazide (HYZAAR) 50-12.5 MG tablet  Dispense: 90 tablet; Refill: 3    Heart palpitations  Two episodes of heart palpitations over the past few weeks that lasted about 30 seconds. Had some associated shortness of breath and chest pain. States his chest pain is non-exertional and occurs very intermittently. Had essentially normal ECG in clinic two weeks ago. Attempted to get him scheduled for an echocardiogram stress test but this was unfortunately never scheduled. He states he will call their number back to get his stress test scheduled. In the mean time, will have him wear zio patch for two weeks to attempt to catch episode of heart palpitation. He does drink about 5 cups of coffee per day, instructed to try to decrease this over the next few weeks.   - ZIO PATCH x 14 days    Chronic cough  History of chronic cough that is slowly resolving. Normal PFTs last week.   - XR CHEST 2 VW    Anxiety  Worsening anxiety the last few months, mostly related to life stressors such as finding a job. Likely contributing to his heart palpitations somewhat in addition to his blood pressure. Previously was on Zoloft many years ago, but he is not sure if this was helpful for him or not. He states he feels his mental health is in an okay space and would like to try something as needed instead of long term for anxiety.   - hydrOXYzine HCl (ATARAX) 25 MG tablet  Dispense: 90 tablet; Refill: 0      Momo Bhakta is a 49 year old, presenting  "for the following health issues:  Episode of heart palpitations over the weekend at a family gathering that lasted around 30 seconds. States it felt like his heart was \"pulling\". Did endorse some shortness of breath that resolved after the tugging sensation went away. He states he has had episodes of palpitations in the past, but that this episode felt different.     Tolerated increase to 50 mg of losartan well without side effects. States he has been taking this daily.     He does endorse some increased anxiety over the past month as he continues to search for a job.         Objective    BP (!) 155/114   Pulse 95   Temp 97.8  F (36.6  C)   Resp 20   Ht 1.818 m (5' 11.58\")   Wt 110.2 kg (243 lb)   SpO2 96%   BMI 33.35 kg/m    Body mass index is 33.35 kg/m .  Physical Exam   GENERAL: alert and no distress  NECK: no adenopathy, no asymmetry, masses, or scars  RESP: lungs clear to auscultation - no rales, rhonchi or wheezes  CV: regular rate and rhythm, normal S1 S2, no S3 or S4, no murmur, click or rub, no peripheral edema  ABDOMEN: soft, nontender, no hepatosplenomegaly, no masses and bowel sounds normal  MS: no gross musculoskeletal defects noted, no edema        Signed Electronically by: ALFONSO MARTINEZ MD    "

## 2024-06-25 LAB
ANION GAP SERPL CALCULATED.3IONS-SCNC: 11 MMOL/L (ref 7–15)
BUN SERPL-MCNC: 9.8 MG/DL (ref 6–20)
CALCIUM SERPL-MCNC: 9.3 MG/DL (ref 8.6–10)
CHLORIDE SERPL-SCNC: 104 MMOL/L (ref 98–107)
CREAT SERPL-MCNC: 0.87 MG/DL (ref 0.67–1.17)
DEPRECATED HCO3 PLAS-SCNC: 22 MMOL/L (ref 22–29)
EGFRCR SERPLBLD CKD-EPI 2021: >90 ML/MIN/1.73M2
GLUCOSE SERPL-MCNC: 101 MG/DL (ref 70–99)
POTASSIUM SERPL-SCNC: 4.4 MMOL/L (ref 3.4–5.3)
SODIUM SERPL-SCNC: 137 MMOL/L (ref 135–145)

## 2024-07-12 PROCEDURE — 93248 EXT ECG>7D<15D REV&INTERPJ: CPT | Performed by: INTERNAL MEDICINE

## 2024-07-20 ENCOUNTER — HOSPITAL ENCOUNTER (OUTPATIENT)
Dept: GENERAL RADIOLOGY | Facility: HOSPITAL | Age: 49
Discharge: HOME | End: 2024-07-20
Attending: NURSE PRACTITIONER | Admitting: NURSE PRACTITIONER
Payer: OTHER GOVERNMENT

## 2024-07-20 ENCOUNTER — OFFICE VISIT (OUTPATIENT)
Dept: FAMILY MEDICINE | Facility: CLINIC | Age: 49
End: 2024-07-20
Payer: OTHER GOVERNMENT

## 2024-07-20 VITALS
DIASTOLIC BLOOD PRESSURE: 122 MMHG | RESPIRATION RATE: 18 BRPM | HEART RATE: 75 BPM | OXYGEN SATURATION: 99 % | SYSTOLIC BLOOD PRESSURE: 163 MMHG | TEMPERATURE: 97.7 F

## 2024-07-20 DIAGNOSIS — S99.921A FOOT INJURY, RIGHT, INITIAL ENCOUNTER: ICD-10-CM

## 2024-07-20 DIAGNOSIS — S92.414A CLOSED NONDISPLACED FRACTURE OF PROXIMAL PHALANX OF RIGHT GREAT TOE, INITIAL ENCOUNTER: Primary | ICD-10-CM

## 2024-07-20 PROCEDURE — 73630 X-RAY EXAM OF FOOT: CPT | Mod: RT

## 2024-07-20 PROCEDURE — 99213 OFFICE O/P EST LOW 20 MIN: CPT | Performed by: NURSE PRACTITIONER

## 2024-07-20 RX ORDER — NAPROXEN 500 MG/1
500 TABLET ORAL 2 TIMES DAILY WITH MEALS
Qty: 20 TABLET | Refills: 0 | Status: SHIPPED | OUTPATIENT
Start: 2024-07-20

## 2024-07-20 ASSESSMENT — PAIN SCALES - GENERAL: PAINLEVEL: SEVERE PAIN (7)

## 2024-07-20 NOTE — PROGRESS NOTES
Patient presents with:  rt toe: Pt bent toe over when tripped on concrete stairs yesterday- swollen, redness and bruising   Pain when walking- some numbness on and off - pain score of 7 now      Clinical Decision Making: Focused exam positive for Right foot with significant swelling, ecchymosis and tenderness at great toe.  Point tenderness at PIP joint,   Unable to flex toe without discomfort.  Patient is able to plantarflex and dorsiflex with mild pain.  Right foot x-ray shows nondisplaced fracture of proximal phalanx of right great toe extending into intra-articular space.    Clinical presentation and medical decision making consistent with right great toe fracture with joint involvement.  Patient placed in boot and encouraged to wear throughout the day only off at night.  Discussed activity restrictions.  Reviewed the role of NSAIDs with food, ice application and elevation.  Referral for orthopedic provider placed.    Reviewed red flag symptoms and when to return for reevaluation, education provided.      ICD-10-CM    1. Closed nondisplaced fracture of proximal phalanx of right great toe, initial encounter  S92.414A Ankle/Foot Bracing Supplies Order Walking Boot; Right; Pneumatic; Tall     Orthopedic  Referral     naproxen (NAPROSYN) 500 MG tablet      2. Foot injury, right, initial encounter  S99.921A XR Foot Right G/E 3 Views          Patient Instructions   Please follow-up with orthopedic provider as discussed.    HPI: Valdemar Rivero Jr. is a 49 year old male who presents today complaining of right great toe injury after sustaining a mechanical fall down concrete stairs 1 day ago.  Reports significant swelling, redness, ecchymosis in the right great toe, difficulty walking and has been utilizing a cane to offset discomfort.  Reports intermittent numbness and great toe due to swelling. Reports pain is a 7/10 on the numeric pain scale.  Reports taking OTC NSAID x 1 dose as well as acetaminophen extra  strength x 1 dose with limited relief, has used ice application with some relief.    History obtained from the patient.    Problem List:  2024-06: Mixed hyperlipidemia  2024-06: Keeley-Davis tear  2024-06: Hiatal hernia  2014-01: Hypercholesteremia  2010-03: Excessive drinking of alcohol  2010-03: Hypertension      Past Medical History:   Diagnosis Date    Excessive drinking of alcohol 03/15/2010    Gastroesophageal reflux disease     Hiatal hernia 06/10/2024    History of angina     Hypertension 03/15/2010       Social History     Tobacco Use    Smoking status: Every Day     Current packs/day: 1.00     Average packs/day: 1 pack/day for 35.5 years (35.5 ttl pk-yrs)     Types: Cigarettes     Start date: 1989    Smokeless tobacco: Never   Substance Use Topics    Alcohol use: Never     Comment: Very rarely       Review of Systems  As noted in HPI    Vitals:    07/20/24 1529   BP: (!) 163/122   BP Location: Right arm   Patient Position: Sitting   Cuff Size: Adult Large   Pulse: 75   Resp: 18   Temp: 97.7  F (36.5  C)   TempSrc: Oral   SpO2: 99%       Physical Exam  Constitutional:       Appearance: Normal appearance.   Cardiovascular:      Rate and Rhythm: Normal rate and regular rhythm.      Pulses: Normal pulses.      Heart sounds: Normal heart sounds.   Pulmonary:      Effort: Pulmonary effort is normal.      Breath sounds: Normal breath sounds.   Musculoskeletal:         General: Swelling, tenderness and signs of injury present.      Comments: Right foot with significant swelling, ecchymosis and tenderness at great toe.  Point tenderness at PIP joint,   Unable to flex toe without discomfort.  Patient is able to plantarflex and dorsiflex with mild pain.  Pulses 2+.  Calf nontender and ankle stable.   Skin:     Findings: Bruising present.   Neurological:      Mental Status: He is alert.         Labs:  Results for orders placed or performed during the hospital encounter of 07/20/24   XR Foot Right G/E 3 Views      Status: None    Narrative    EXAM: XR FOOT RIGHT G/E 3 VIEWS  LOCATION: [facility]  DATE: 7/20/2024    INDICATION: Mechanical fall to right foot, persistent right great toe pain.  Please eval for fracture or other acute process?  COMPARISON: None.      Impression    IMPRESSION: Acute oblique nondisplaced fracture of the proximal phalanx of the great toe intra-articular extension into the IP joint. Mild degenerative changes at the first MTP joint.         At the end of the encounter, I discussed results, diagnosis, medications. Discussed red flags for immediate return to clinic/ER, as well as indications for follow up if no improvement. Patient understood and agreed to plan.     MEKHI Walter CNP

## 2024-07-20 NOTE — LETTER
July 20, 2024      Valdemar Rivero Jr.  [address]  [place] [address]        To Whom It May Concern:    Valdemar Rivero Jr. was seen in our clinic. He may return to work with the following: limited to light duty - standing limited to 1-2 hrs, affected area must remain elevated, and must be able to take a break for 5 mins every 1-2 hrs to move position for the next 2-4 weeks.      Sincerely,      Shila Ramirez

## 2024-09-11 ENCOUNTER — TELEPHONE (OUTPATIENT)
Dept: GASTROENTEROLOGY | Facility: CLINIC | Age: 49
End: 2024-09-11
Payer: OTHER GOVERNMENT

## 2024-09-11 NOTE — TELEPHONE ENCOUNTER
Caller: Valdemar Rivero Jr.     Reason for Reschedule/Cancellation   (please be detailed, any staff messages or encounters to note?): symptoms have gotten better and just started a new job so can't take time off      Prior to reschedule please review:  Ordering Provider: Kimberly  Sedation Determined: mac  Does patient have any ASC Exclusions, please identify?: n      Notes on Cancelled Procedure:  Procedure: Upper Endoscopy [EGD]   Date: 9/25  Location: [facility]; [address] [place] [address], [address] Floor, [place] [address]  Surgeon:       Rescheduled: No,         Did you cancel or rescheduled an EUS procedure? No.

## 2024-12-23 ENCOUNTER — ANCILLARY PROCEDURE (OUTPATIENT)
Dept: GENERAL RADIOLOGY | Facility: CLINIC | Age: 49
End: 2024-12-23
Payer: OTHER GOVERNMENT

## 2024-12-23 ENCOUNTER — OFFICE VISIT (OUTPATIENT)
Dept: FAMILY MEDICINE | Facility: CLINIC | Age: 49
End: 2024-12-23
Payer: OTHER GOVERNMENT

## 2024-12-23 VITALS
HEIGHT: 71 IN | SYSTOLIC BLOOD PRESSURE: 160 MMHG | RESPIRATION RATE: 20 BRPM | DIASTOLIC BLOOD PRESSURE: 100 MMHG | HEART RATE: 104 BPM | TEMPERATURE: 98.8 F | OXYGEN SATURATION: 97 % | BODY MASS INDEX: 32.49 KG/M2 | WEIGHT: 232.08 LBS

## 2024-12-23 DIAGNOSIS — R06.2 WHEEZING: ICD-10-CM

## 2024-12-23 DIAGNOSIS — R05.1 ACUTE COUGH: Primary | ICD-10-CM

## 2024-12-23 DIAGNOSIS — R05.1 ACUTE COUGH: ICD-10-CM

## 2024-12-23 DIAGNOSIS — I10 ESSENTIAL HYPERTENSION: ICD-10-CM

## 2024-12-23 LAB
FLUAV RNA SPEC QL NAA+PROBE: NEGATIVE
FLUBV RNA RESP QL NAA+PROBE: NEGATIVE
RSV RNA SPEC NAA+PROBE: NEGATIVE
SARS-COV-2 RNA RESP QL NAA+PROBE: NEGATIVE

## 2024-12-23 PROCEDURE — 99214 OFFICE O/P EST MOD 30 MIN: CPT | Mod: GC

## 2024-12-23 PROCEDURE — 87637 SARSCOV2&INF A&B&RSV AMP PRB: CPT

## 2024-12-23 PROCEDURE — 71046 X-RAY EXAM CHEST 2 VIEWS: CPT | Mod: TC | Performed by: RADIOLOGY

## 2024-12-23 RX ORDER — BENZONATATE 100 MG/1
100 CAPSULE ORAL 3 TIMES DAILY PRN
Qty: 30 CAPSULE | Refills: 1 | Status: SHIPPED | OUTPATIENT
Start: 2024-12-23

## 2024-12-23 RX ORDER — ALBUTEROL SULFATE 90 UG/1
1 INHALANT RESPIRATORY (INHALATION) EVERY 4 HOURS PRN
Qty: 6.7 G | Refills: 0 | Status: SHIPPED | OUTPATIENT
Start: 2024-12-23

## 2024-12-23 RX ORDER — LOSARTAN POTASSIUM AND HYDROCHLOROTHIAZIDE 12.5; 5 MG/1; MG/1
2 TABLET ORAL DAILY
Qty: 90 TABLET | Refills: 3 | Status: SHIPPED | OUTPATIENT
Start: 2024-12-23

## 2024-12-23 NOTE — PROGRESS NOTES
"  Assessment & Plan     (R05.1) Acute cough  (primary encounter diagnosis)  Comment: Cough x1 week, productive, chest congestion and discomfort present. Suspect likely viral etiology in setting of negative CXR here today, no evidence of consolidation. Will obtain covid/flu/RSV, treat symptomatically, encourage generous hydration and rest. Return precautions provided.   Plan: Influenza A/B, RSV and SARS-CoV2 PCR         (COVID-19), benzonatate (TESSALON) 100 MG         capsule, XR CHEST 2 VW            (I10) Essential hypertension  Comment: BP not at goal today despite recent transition to combo regimen, will increase to 2 tablets daily (100-25) of Hyzaar and plan for 2 week follow up to recheck BP and BMP. Has BP monitoring at home  Plan: losartan-hydrochlorothiazide (HYZAAR) 50-12.5         MG tablet            (R06.2) Wheezing  Comment: exacerbated by current illness, refill sent  Plan: albuterol (PROAIR HFA/PROVENTIL HFA/VENTOLIN         HFA) 108 (90 Base) MCG/ACT inhaler                  BMI  Estimated body mass index is 32.13 kg/m  as calculated from the following:    Height as of this encounter: 1.81 m (5' 11.26\").    Weight as of this encounter: 105.3 kg (232 lb 1.3 oz).     No follow-ups on file.    Momo Bhakta is a 49 year old, presenting for the following health issues:  RECHECK (Pt complains about chest and lung pain going on for about a week as well as a on going cough.)    Yony Rivero is a 48 y/o M presenting to clinic today to discuss concerns of sinus infection vs. URI. Concerns of chest discomfort/congestion, cough, pain w/deep inspiration x1 week, cough is productive, brown/green phlegm, wheezing. Has trialed nasal spray for congestion and mucinex, endorses low energy, muscle aches, cramping, etc. Coworker was sick (not pneumonia or flu). Endorsed fevers/chills initially but this has resolved. Home covid test was negative yesterday. Smokes 1 ppd (less in the last few days). Has tried to quit in " "the past with chantix, nicotine gum, etc. Does not want any additional intervention.     Appears from chart review had PFT's done 6/2024, no known prior diagnosis of COPD or asthma.     /100 today in office. Checks BP at home, have been running around 150's systolic.                  Objective    BP (!) 160/100 (BP Location: Right arm, Patient Position: Sitting, Cuff Size: Adult Large)   Pulse 104   Temp 98.8  F (37.1  C) (Oral)   Resp 20   Ht 1.81 m (5' 11.26\")   Wt 105.3 kg (232 lb 1.3 oz)   SpO2 97%   BMI 32.13 kg/m    Body mass index is 32.13 kg/m .  Physical Exam   GENERAL: alert and no distress  NECK: no adenopathy, no asymmetry, masses, or scars  RESP: lungs clear to auscultation - no rales, rhonchi or wheezes, no rales , rhonchi R lower posterior, L lower posterior, and throughout, and expiratory wheezes throughout  CV: regular rate and rhythm, normal S1 S2, no S3 or S4, no murmur, click or rub, no peripheral edema  ABDOMEN: soft, nontender, no hepatosplenomegaly, no masses and bowel sounds normal  MS: no gross musculoskeletal defects noted, no edema        Signed Electronically by: Otto Sadler MD  {Email feedback regarding this note to [email or web address]   :[id number]}  "

## 2024-12-23 NOTE — PROGRESS NOTES
Faculty Supervision of Residents   I have examined this patient and the medical care has been evaluated and discussed with the resident. See resident note outlining our discussion.    Fabiola Salazar MD

## 2025-01-22 DIAGNOSIS — R06.2 WHEEZING: ICD-10-CM

## 2025-01-22 RX ORDER — ALBUTEROL SULFATE 90 UG/1
1 INHALANT RESPIRATORY (INHALATION) EVERY 4 HOURS PRN
Qty: 6.7 G | Refills: 5 | Status: SHIPPED | OUTPATIENT
Start: 2025-01-22

## 2025-03-16 ENCOUNTER — HEALTH MAINTENANCE LETTER (OUTPATIENT)
Age: 50
End: 2025-03-16

## 2025-04-16 ENCOUNTER — OFFICE VISIT (OUTPATIENT)
Dept: FAMILY MEDICINE | Facility: CLINIC | Age: 50
End: 2025-04-16
Payer: OTHER GOVERNMENT

## 2025-04-16 VITALS
BODY MASS INDEX: 33.74 KG/M2 | TEMPERATURE: 98.7 F | WEIGHT: 241 LBS | HEIGHT: 71 IN | HEART RATE: 94 BPM | DIASTOLIC BLOOD PRESSURE: 103 MMHG | OXYGEN SATURATION: 96 % | RESPIRATION RATE: 20 BRPM | SYSTOLIC BLOOD PRESSURE: 164 MMHG

## 2025-04-16 DIAGNOSIS — I78.1 CUTANEOUS TELANGIECTASIA: ICD-10-CM

## 2025-04-16 DIAGNOSIS — K29.70 GASTRITIS WITHOUT BLEEDING, UNSPECIFIED CHRONICITY, UNSPECIFIED GASTRITIS TYPE: ICD-10-CM

## 2025-04-16 DIAGNOSIS — E78.5 HYPERLIPIDEMIA LDL GOAL <130: ICD-10-CM

## 2025-04-16 DIAGNOSIS — I10 ESSENTIAL HYPERTENSION: Primary | ICD-10-CM

## 2025-04-16 DIAGNOSIS — F41.9 ANXIETY: ICD-10-CM

## 2025-04-16 RX ORDER — OMEPRAZOLE 40 MG/1
40 CAPSULE, DELAYED RELEASE ORAL DAILY
Qty: 90 CAPSULE | Refills: 0 | Status: SHIPPED | OUTPATIENT
Start: 2025-04-16

## 2025-04-16 RX ORDER — ATORVASTATIN CALCIUM 40 MG/1
40 TABLET, FILM COATED ORAL DAILY
Qty: 90 TABLET | Refills: 0 | Status: SHIPPED | OUTPATIENT
Start: 2025-04-16

## 2025-04-16 RX ORDER — HYDROXYZINE HYDROCHLORIDE 25 MG/1
25 TABLET, FILM COATED ORAL 3 TIMES DAILY PRN
Qty: 90 TABLET | Refills: 0 | Status: SHIPPED | OUTPATIENT
Start: 2025-04-16

## 2025-04-16 RX ORDER — LOSARTAN POTASSIUM AND HYDROCHLOROTHIAZIDE 12.5; 5 MG/1; MG/1
2 TABLET ORAL DAILY
Qty: 90 TABLET | Refills: 1 | Status: SHIPPED | OUTPATIENT
Start: 2025-04-16

## 2025-04-16 NOTE — PROGRESS NOTES
Assessment & Plan     (I10) Essential hypertension  (primary encounter diagnosis)  Comment: Blood pressure 164/103 today.  No hypertensive symptoms.  Took his medications today however he drank a significant amount last night and then had multiple coffees this morning and smoked tobacco on the way.  He states he had normal blood pressure at his dental visit a year ago 130/80-90.  On my chart review patient has had above goal blood pressures for his last 8 visits.  We will have him follow-up closely as below for cryotherapy and for blood pressure follow-up.  Refills provided for his medication.  Plan: losartan-hydrochlorothiazide (HYZAAR) 50-12.5         MG tablet         - Follow-up in 2 weeks   - Asked patient to check his blood pressure at home.    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  Patient itched and prodded at it and eventually popped it.  Since then he has had 5 episodes of bleeding from that lesion, mostly while he is showering.  Is a significant mount of blood when it bleeds.  Outside of this bleeding it is not itchy, painful.  It has not been changing in the last 20 years.  It is the same size it was prior.  At this point I have concern for a telangiectasia that has become inflamed with recent trauma to it and is now symptomatic with bleeding.  Discussed options for treatment including cryotherapy, electrocautery and dermatology referral.  Patient wishes to return for cryotherapy.  Patient will return  Plan:    - In the next few weeks at his convenience for cryotherapy to the nose    (E78.5) Hyperlipidemia LDL goal <130  Comment: Refills provided, also needs lipid panel and may update at next visit  Plan: atorvastatin (LIPITOR) 40 MG tablet           (K29.70) Gastritis without bleeding, unspecified chronicity, unspecified gastritis type  Comment: Refills provided refills  "provided  Plan: omeprazole (PRILOSEC) 40 MG DR capsule            (F41.9) Anxiety  Comment: Refills provided  Plan: hydrOXYzine HCl (ATARAX) 25 MG tablet               Subjective   Yony is a 50 year old, presenting for the following health issues:  Bump on Nose (Bleeding when taking hot shower. ), Medication Refill, Imm/Inj (Hep B), and Hypertension      4/16/2025     3:02 PM   Additional Questions   Roomed by Paw   Accompanied by N/A         4/16/2025    Information    services provided? No     HPI          Red bump on nose - 6 months ago  Been there for year  Small ovelrying skin layer     Bleeds a lot     Last bled 4 weeks ago - hot shower    5-6 times bleeding in last 6 months.    Never itch or painful.     Been there 20 years.       Not checking Bps.     BP Readings from Last 6 Encounters:   04/16/25 (!) 164/103   12/23/24 (!) 160/100   07/20/24 (!) 163/122   06/24/24 (!) 155/114   06/10/24 (!) 157/101   05/22/24 (!) 154/96               Objective    BP (!) 164/103   Pulse 94   Temp 98.7  F (37.1  C) (Tympanic)   Resp 20   Ht 1.81 m (5' 11.26\")   Wt 109.3 kg (241 lb)   SpO2 96%   BMI 33.37 kg/m    Body mass index is 33.37 kg/m .  Physical Exam   GENERAL: Healthy, alert and no distress  EYES: Eyes grossly normal to inspection.  No discharge or erythema, or obvious scleral/conjunctival abnormalities.  SKIN: Lesion on nose, see media tab. There is a less than 1 cm erythematous flat lesion of the right nose. No central umbilication. Symmetric erythema and clear borders.  RESP:  Breathing comfortably on room air  CV: Appears well perfused  MSK: No gross deformity. Normal tone.  NEURO: Cranial nerves grossly intact.  Mentation and speech appropriate for age.  PSYCH: Mentation appears normal, affect normal/bright, judgement and insight intact, normal speech and appearance well-groomed.              Signed Electronically by: Germain Hackett MD    "

## 2025-04-16 NOTE — PROGRESS NOTES
Preceptor Attestation:   Patient seen, evaluated and discussed with the resident. I have verified the content of the note, which accurately reflects my assessment of the patient and the plan of care.    Supervising Physician:Nel Regan MD    [facility]

## 2025-07-14 DIAGNOSIS — I10 ESSENTIAL HYPERTENSION: ICD-10-CM

## 2025-07-14 RX ORDER — LOSARTAN POTASSIUM AND HYDROCHLOROTHIAZIDE 12.5; 5 MG/1; MG/1
2 TABLET ORAL DAILY
Qty: 90 TABLET | Refills: 1 | Status: SHIPPED | OUTPATIENT
Start: 2025-07-14

## 2025-07-14 NOTE — TELEPHONE ENCOUNTER
Message to physician:     Date of last visit: 4/16/2025    Date of next visit if scheduled:     Potassium   Date Value Ref Range Status   06/24/2024 4.4 3.4 - 5.3 mmol/L Final   02/09/2023 4.6 3.4 - 5.3 mmol/L Final     Creatinine   Date Value Ref Range Status   06/24/2024 0.87 0.67 - 1.17 mg/dL Final     GFR Estimate   Date Value Ref Range Status   06/24/2024 >90 >60 mL/min/1.73m2 Final     Comment:     eGFR calculated using 2021 CKD-EPI equation.       BP Readings from Last 3 Encounters:   04/16/25 (!) 164/103   12/23/24 (!) 160/100   07/20/24 (!) 163/122       Hemoglobin A1C   Date Value Ref Range Status   04/26/2024 5.4 0.0 - 5.6 % Final     Comment:     Normal <5.7%   Prediabetes 5.7-6.4%    Diabetes 6.5% or higher     Note: Adopted from ADA consensus guidelines.       Please complete refill and CLOSE ENCOUNTER.  Closing the encounter signifies the refill is complete.

## 2025-07-23 DIAGNOSIS — K29.70 GASTRITIS WITHOUT BLEEDING, UNSPECIFIED CHRONICITY, UNSPECIFIED GASTRITIS TYPE: ICD-10-CM

## 2025-07-23 RX ORDER — OMEPRAZOLE 40 MG/1
40 CAPSULE, DELAYED RELEASE ORAL DAILY
Qty: 90 CAPSULE | Refills: 0 | Status: SHIPPED | OUTPATIENT
Start: 2025-07-23